# Patient Record
Sex: MALE | Race: WHITE | NOT HISPANIC OR LATINO | URBAN - METROPOLITAN AREA
[De-identification: names, ages, dates, MRNs, and addresses within clinical notes are randomized per-mention and may not be internally consistent; named-entity substitution may affect disease eponyms.]

---

## 2014-05-21 LAB
CREAT SERPL-MCNC: 0.99 MG/DL (ref 0.72–1.25)
GLUCOSE BLD-MCNC: 80 MG/DL (ref 65–100)

## 2017-01-09 ENCOUNTER — OFFICE VISIT - RIVER FALLS (OUTPATIENT)
Dept: FAMILY MEDICINE | Facility: CLINIC | Age: 72
End: 2017-01-09

## 2017-01-18 ENCOUNTER — OFFICE VISIT - RIVER FALLS (OUTPATIENT)
Dept: FAMILY MEDICINE | Facility: CLINIC | Age: 72
End: 2017-01-18

## 2017-01-18 ASSESSMENT — MIFFLIN-ST. JEOR: SCORE: 1631.67

## 2017-04-03 ENCOUNTER — OFFICE VISIT - RIVER FALLS (OUTPATIENT)
Dept: FAMILY MEDICINE | Facility: CLINIC | Age: 72
End: 2017-04-03

## 2017-04-12 ENCOUNTER — OFFICE VISIT - RIVER FALLS (OUTPATIENT)
Dept: FAMILY MEDICINE | Facility: CLINIC | Age: 72
End: 2017-04-12

## 2017-04-12 ASSESSMENT — MIFFLIN-ST. JEOR: SCORE: 1604.46

## 2017-05-12 ENCOUNTER — OFFICE VISIT - RIVER FALLS (OUTPATIENT)
Dept: FAMILY MEDICINE | Facility: CLINIC | Age: 72
End: 2017-05-12

## 2017-05-12 ASSESSMENT — MIFFLIN-ST. JEOR: SCORE: 1604.46

## 2017-08-23 ENCOUNTER — AMBULATORY - RIVER FALLS (OUTPATIENT)
Dept: FAMILY MEDICINE | Facility: CLINIC | Age: 72
End: 2017-08-23

## 2017-08-24 LAB
CHOLEST SERPL-MCNC: 183 MG/DL
CHOLEST/HDLC SERPL: 3.8 {RATIO}
CREAT SERPL-MCNC: 1.06 MG/DL (ref 0.7–1.18)
GLUCOSE BLD-MCNC: 91 MG/DL (ref 65–99)
HBA1C MFR BLD: 5.5 %
HDLC SERPL-MCNC: 48 MG/DL
LDLC SERPL CALC-MCNC: 112 MG/DL
NONHDLC SERPL-MCNC: 135 MG/DL
TRIGL SERPL-MCNC: 124 MG/DL

## 2017-08-30 ENCOUNTER — OFFICE VISIT - RIVER FALLS (OUTPATIENT)
Dept: FAMILY MEDICINE | Facility: CLINIC | Age: 72
End: 2017-08-30

## 2017-08-30 ASSESSMENT — MIFFLIN-ST. JEOR: SCORE: 1646.19

## 2018-01-01 ENCOUNTER — OFFICE VISIT - RIVER FALLS (OUTPATIENT)
Dept: FAMILY MEDICINE | Facility: CLINIC | Age: 73
End: 2018-01-01

## 2018-01-01 ENCOUNTER — AMBULATORY - RIVER FALLS (OUTPATIENT)
Dept: FAMILY MEDICINE | Facility: CLINIC | Age: 73
End: 2018-01-01

## 2018-01-01 LAB
CREAT SERPL-MCNC: 0.76 MG/DL
CREAT SERPL-MCNC: 0.77 MG/DL
CREAT SERPL-MCNC: 1.11 MG/DL (ref 0.7–1.18)
CREAT SERPL-MCNC: 1.28 MG/DL (ref 0.72–1.25)
GLUCOSE BLD-MCNC: 116 MG/DL (ref 65–99)
HBA1C MFR BLD: 5.8 %

## 2018-01-01 ASSESSMENT — MIFFLIN-ST. JEOR
SCORE: 1560
SCORE: 1565.45
SCORE: 1476.54
SCORE: 1612.62
SCORE: 1610.81

## 2018-01-24 ENCOUNTER — OFFICE VISIT - RIVER FALLS (OUTPATIENT)
Dept: FAMILY MEDICINE | Facility: CLINIC | Age: 73
End: 2018-01-24

## 2018-01-24 ENCOUNTER — COMMUNICATION - RIVER FALLS (OUTPATIENT)
Dept: FAMILY MEDICINE | Facility: CLINIC | Age: 73
End: 2018-01-24

## 2018-01-24 LAB
CREAT SERPL-MCNC: 1.14 MG/DL (ref 0.72–1.25)
GLUCOSE BLD-MCNC: 100 MG/DL (ref 65–100)

## 2018-01-24 ASSESSMENT — MIFFLIN-ST. JEOR: SCORE: 1666.15

## 2018-01-26 ENCOUNTER — OFFICE VISIT - RIVER FALLS (OUTPATIENT)
Dept: FAMILY MEDICINE | Facility: CLINIC | Age: 73
End: 2018-01-26

## 2018-01-26 ASSESSMENT — MIFFLIN-ST. JEOR: SCORE: 1664.33

## 2022-02-11 VITALS
BODY MASS INDEX: 29.95 KG/M2 | HEIGHT: 68 IN | HEIGHT: 68 IN | TEMPERATURE: 97.9 F | HEART RATE: 90 BPM | SYSTOLIC BLOOD PRESSURE: 142 MMHG | SYSTOLIC BLOOD PRESSURE: 118 MMHG | WEIGHT: 209.8 LBS | WEIGHT: 198 LBS | DIASTOLIC BLOOD PRESSURE: 68 MMHG | TEMPERATURE: 97.8 F | BODY MASS INDEX: 30.01 KG/M2 | HEART RATE: 113 BPM | HEART RATE: 76 BPM | TEMPERATURE: 99.4 F | DIASTOLIC BLOOD PRESSURE: 62 MMHG | SYSTOLIC BLOOD PRESSURE: 120 MMHG | DIASTOLIC BLOOD PRESSURE: 64 MMHG | OXYGEN SATURATION: 97 % | WEIGHT: 209.4 LBS | TEMPERATURE: 102.1 F | SYSTOLIC BLOOD PRESSURE: 124 MMHG | DIASTOLIC BLOOD PRESSURE: 56 MMHG | HEIGHT: 68 IN | BODY MASS INDEX: 31.74 KG/M2 | DIASTOLIC BLOOD PRESSURE: 50 MMHG | HEART RATE: 78 BPM | WEIGHT: 197.6 LBS | BODY MASS INDEX: 31.8 KG/M2 | HEIGHT: 68 IN | HEART RATE: 84 BPM | SYSTOLIC BLOOD PRESSURE: 144 MMHG

## 2022-02-11 VITALS
TEMPERATURE: 97.9 F | DIASTOLIC BLOOD PRESSURE: 70 MMHG | TEMPERATURE: 98.5 F | WEIGHT: 187.6 LBS | DIASTOLIC BLOOD PRESSURE: 60 MMHG | OXYGEN SATURATION: 99 % | SYSTOLIC BLOOD PRESSURE: 128 MMHG | HEART RATE: 86 BPM | OXYGEN SATURATION: 98 % | BODY MASS INDEX: 28.86 KG/M2 | HEIGHT: 68 IN | BODY MASS INDEX: 28.43 KG/M2 | HEART RATE: 89 BPM | WEIGHT: 189.8 LBS | SYSTOLIC BLOOD PRESSURE: 120 MMHG

## 2022-02-11 VITALS
BODY MASS INDEX: 30.65 KG/M2 | SYSTOLIC BLOOD PRESSURE: 114 MMHG | OXYGEN SATURATION: 93 % | DIASTOLIC BLOOD PRESSURE: 72 MMHG | HEART RATE: 83 BPM | HEIGHT: 68 IN | TEMPERATURE: 97.8 F | WEIGHT: 202.2 LBS

## 2022-02-11 VITALS
SYSTOLIC BLOOD PRESSURE: 110 MMHG | SYSTOLIC BLOOD PRESSURE: 120 MMHG | BODY MASS INDEX: 26.3 KG/M2 | HEIGHT: 68 IN | HEART RATE: 96 BPM | BODY MASS INDEX: 26.88 KG/M2 | WEIGHT: 173 LBS | WEIGHT: 176.8 LBS | DIASTOLIC BLOOD PRESSURE: 64 MMHG | TEMPERATURE: 97.8 F | SYSTOLIC BLOOD PRESSURE: 118 MMHG | TEMPERATURE: 97.4 F | DIASTOLIC BLOOD PRESSURE: 60 MMHG | HEART RATE: 90 BPM | HEART RATE: 80 BPM | OXYGEN SATURATION: 98 % | TEMPERATURE: 97.9 F | HEIGHT: 68 IN | WEIGHT: 168 LBS | WEIGHT: 186.4 LBS | DIASTOLIC BLOOD PRESSURE: 60 MMHG | SYSTOLIC BLOOD PRESSURE: 108 MMHG | HEART RATE: 84 BPM | DIASTOLIC BLOOD PRESSURE: 68 MMHG | TEMPERATURE: 97 F | BODY MASS INDEX: 28.25 KG/M2 | BODY MASS INDEX: 25.46 KG/M2

## 2022-02-11 VITALS
WEIGHT: 196.2 LBS | SYSTOLIC BLOOD PRESSURE: 121 MMHG | WEIGHT: 196.2 LBS | HEART RATE: 84 BPM | DIASTOLIC BLOOD PRESSURE: 78 MMHG | BODY MASS INDEX: 29.73 KG/M2 | HEIGHT: 68 IN | BODY MASS INDEX: 29.73 KG/M2 | HEIGHT: 68 IN | TEMPERATURE: 98.1 F | TEMPERATURE: 97.7 F

## 2022-02-11 VITALS
HEART RATE: 76 BPM | HEIGHT: 68 IN | SYSTOLIC BLOOD PRESSURE: 136 MMHG | DIASTOLIC BLOOD PRESSURE: 81 MMHG | BODY MASS INDEX: 31.13 KG/M2 | TEMPERATURE: 97.9 F | WEIGHT: 205.4 LBS

## 2022-02-16 NOTE — PROGRESS NOTES
Chief Complaint    lump on left thigh, painfull while walking.  History of Present Illness      Patient is here with complaints of a painful mass his posterior left thigh.  It bothers him when he is walking is sitting in a hard chair.  He noticed this after he lost about 30 pounds.  He has a history of myelodysplastic syndrome and has transfusion dependent anemia and thrombocytopenia.  He was recently hospitalized for a neutropenic fever.  He denies any trauma.  There has been no changes in his skin.  Review of Systems      Insert default review systems  Physical Exam   Vitals & Measurements    T: 97.0   F (Tympanic)  HR: 84(Peripheral)  BP: 120/68       Alert, oriented, no acute distress      Normal heart rate      Nonlabored breathing, lungs clear       6 cm, soft, slightly tender mass posterior left thigh  Assessment/Plan       Lipoma of left thigh(D17.24)        Location and texture of this lesion are consistent with a lipoma.        Ultrasound for further evaluation to differentiate this from other issues such as hematoma or sarcoma.  Patient Information     Name:SANTOSH TEJADA      Address:      [address] [place] [address]      Sex:Male      YOB: 1945      [phone]      Emergency Contact:BERNIE CARRASCO     MRN:[id number]     FIN:[id number]     Location:[facility]     Date of Service:07/02/2018      Primary Care Physician:       Dylon Evans MD, [phone]      Attending Physician:       Dylon Ramon MD, [phone]  Problem List/Past Medical History    Ongoing     Chemical dependency       Comments: leading to liver failure     Chemotherapy-induced neutropenia     Chronic subdural hematoma     Esophageal varices     Hayfever     History of osteopenia     Hypertensive disorder       Comments: Due to Cirrhosis     Liver cirrhosis, alcoholic     Lymphoplasmacytoid lymphoma, CLL     Malrotation of intestine     Myelodysplastic syndrome     Neuropathy      Neutropenic fever     Obesity     Osteoporosis     Pancytopenia     Prediabetes     Renal insufficiency     Tubular adenoma    Historical     Esophageal ulcer       Comments: With esophagitis; blood transfusion 2007     Inguinal hernia     Inpatient stay       Comments: @[facility] - neutropenic fever     Inpatient stay       Comments: @[facility], [place] - Myelodysplastic syndrome with persistent febrile neutropenia.     Inpatient stay       Comments: @[place], [place] - Neutropenic fever  Procedure/Surgical History     Bone marrow biopsy (06/11/2018)     Bone marrow biopsy (06/01/2018)     Bone marrow biopsy (11/04/2016)     Colonoscopy (04/08/2015)     Esophagogastroduodenoscopy (04/05/2011)     Colonoscopy (02/23/2011)     Colonoscopy (02/23/2010)     Echocardiogram (10/2009)     Colonoscopy (09/2005)     Cataract extraction (12/2000)     Colonoscopy (1984)     Herniorrhaphy (1981)     Herniorrhaphy (1959)     Appendectomy (1957)     Repair procedure for malrotation of intestine  Medications        Vitamin B1: 100 mg, daily.        Centrum oral tablet: 1 tab(s), PO, Daily, 90 tab(s).        Claritin 10 mg oral tablet: 10 mg, 1 tab(s), PO, Daily, for 10 day(s), 10 tab(s), 0 Refill(s).        calcium (as carbonate) 600 mg oral tablet: 600 mg, 1 tab(s), po, tid, 0 Refill(s).        MiraLax oral powder for reconstitution: 17 gm, po, daily, 0 Refill(s).        Zantac 300 oral tablet: 300 mg, 1 tab(s), PO, Once a day (at bedtime), Will try instead of omeprazole, 95 tab(s), 3 Refill(s).        nortriptyline 50 mg oral capsule: 50 mg, 1 cap(s), PO, daily, 95 cap(s), 3 Refill(s).        Neurontin 600 mg oral tablet: 600 mg, 1 tab(s), PO, qid, 370 tab(s), 3 Refill(s).        Vitamin C 1000 mg oral tablet: 1,000 mg, 1 tab(s), po, daily, 0 Refill(s).        prochlorperazine 10 mg oral tablet: 10 mg, 1 tab(s), po, q6 hrs, Per [facility] 3/11/2018, 0 Refill(s).        Vitamin K-2: take  100 mcg once daily, 0 Refill(s).        Magic Mouthwash: take 10 mL by mouth 4 times daily if needed. Swish and spit for mouth sores, garle for sore throat, 0 Refill(s).        predniSONE 20 mg oral tablet: See Instructions, 2 tab(s) po daily 5 day(s), then 1 tab daily x 5 days, then 0.5 tab daily x 6 days, 0 Refill(s).        voriconazole 200 mg oral tablet: 200 mg, 1 tab(s), po, q12 hrs, for 21 day(s), start date 6/13/18 end date 7/4/18 per [employer], 0 Refill(s).                Allergies    spironolactone (gynecomastia)  Social History    Smoking Status - 07/02/2018     Never smoker     Alcohol - Past, 03/08/2012      Past, 03/08/2012     Employment and Education      Retired, 03/09/2011     Exercise and Physical Activity - Does not exercise, 03/08/2012     Tobacco - Denies Tobacco Use, 03/15/2010      Never, 03/08/2012  Family History    CA - Cancer of colon: Father.    Cerebral aneurysm: Mother.    Diabetes mellitus: Brother.    Hypertension: Sister.    Meningitis: Brother.    Rheumatic fever: Sister.    Seizure disorder: Brother.    Systemic Lupus Erythematosus: Sister.  Immunizations      Vaccine Date Status Comments      influenza virus vaccine, inactivated 08/30/2017 Given      influenza virus vaccine, inactivated 11/03/2016 Given      hepatitis B adult vaccine 03/30/2016 Given      influenza virus vaccine, inactivated 10/13/2015 Given      hepatitis A adult vaccine 10/13/2015 Given      pneumococcal (PCV13) 10/13/2015 Given      hepatitis B adult vaccine 10/13/2015 Given      hepatitis B adult vaccine 04/13/2015 Given      hepatitis B adult vaccine 03/11/2015 Given      hepatitis A adult vaccine 03/11/2015 Given      pneumococcal (PPSV23) 09/11/2014 Given      influenza virus vaccine, inactivated 09/11/2014 Given      tetanus/diphth/pertuss (Tdap) adult/adol 12/22/2011 Given Patient gave verbal consent.      influenza 09/29/2009 Recorded      pneumococcal (PPSV23) 09/01/2009 Recorded      Td 11/12/2002  Recorded      Td 11/01/1992 Recorded  Lab Results          Lab Results (Last 4 results within 90 days)           WBC: 2.0 Low [4.5  - 11] (06/14/18 14:44:00)          RBC: 3.06 Low [4.3  - 5.9] (06/14/18 14:44:00)          Hgb: 9.1 Low [13.5 g/dL - 17.5 g/dL] (06/14/18 14:44:00)          Hct: 26.8 Low [37 % - 53 %] (06/14/18 14:44:00)          MCV: 88 [80 fL - 100 fL] (06/14/18 14:44:00)          MCH: 29.7 [26 pg - 34 pg] (06/14/18 14:44:00)          MCHC: 34.0 [32 g/dL - 36 g/dL] (06/14/18 14:44:00)          RDW: 12.8 [11.5 % - 15.5 %] (06/14/18 14:44:00)          Platelet: 29 Low [140  - 440] (06/14/18 14:44:00)          MPV: 10.9 [6.5 fL - 11 fL] (06/14/18 14:44:00)          Metamyelocytes Man: 0.0 [80 fL - 100 fL] (06/14/18 14:44:00)          Myelocytes Man: 0.0 [6.5 fL - 11 fL] (06/14/18 14:44:00)          Promyelocytes Man: 0.0 [140  - 440] (06/14/18 14:44:00)          Blasts Man: 0.0 (06/14/18 14:44:00)          Other Cells Man: 0.0 [0.9  - 2.9] (06/14/18 14:44:00)          Lymphocytes: 29.0 [4.5  - 11] (06/14/18 14:44:00)          Abs Lymphocytes: 0.6 Low [0.9  - 2.9] (06/14/18 14:44:00)          Neutrophils: 43.0 [0.9  - 2.9] (06/14/18 14:44:00)          Abs Neutrophils: 0.9 Low [1.7  - 7] (06/14/18 14:44:00)          Monocytes: 28.0 [4.5  - 11] (06/14/18 14:44:00)          Abs Monocytes: 0.6 [0.9  - 2.9] (06/14/18 14:44:00)          Eosinophils: 0.0 [4.5  - 11] (06/14/18 14:44:00)          Abs Eosinophils: 0.0 [0.9  - 2.9] (06/14/18 14:44:00)          Basophils: 0.0 [4.5  - 11] (06/14/18 14:44:00)          Abs Basophils: 0.0 [0.9  - 2.9] (06/14/18 14:44:00)          Plasma Cells: 0.0 [0.9  - 2.9] (06/14/18 14:44:00)          NRBCs/100 WBC: 3.0 [4.5  - 11] (06/14/18 14:44:00)          NRBCs Abs#: 0.1 [4.5  - 11] (06/14/18 14:44:00)          Platelet Estimate: Decreased [0.9  - 2.9] (06/14/18 14:44:00)          Basophillic Stippling: Present [4.5  - 11] (06/14/18 14:44:00)          RBC Comments: Present [0.9   - 2.9] (06/14/18 14:44:00)

## 2022-02-16 NOTE — PROGRESS NOTES
Chief Complaint    Decrease of sensation in right hand, has been dropping things. Started yesterday afternoon.  History of Present Illness      Has lost tactile feeling in the right hand starting yesterday. Began Vidaza daily for 7 days on Monday and symptoms began the next day. No vision or hearing changes. No headaches. Denies weakness in the hand but dropping things due to lack of sensation.  Review of Systems      No fevers       No vomiting       No shortness of breath       No significantly increased bruising  Physical Exam   Vitals & Measurements    T: 99.4(Tympanic)  HR: 90(Peripheral)  BP: 124/62     HT: 68 in  WT: 197.6 lb  BMI: 30.04       General: No acute distress.      HENT: Tympanic membranes are clear, No pharyngeal erythema.      Neck: No lymphadenopathy.      Respiratory: Lungs are clear to auscultation.      Cardiovascular: Normal rate, Regular rhythm.      Musculoskeletal: Normal gait.  Good strength and range of motion in both arms and legs.  Good  strength in right hand.       Neurologic: Cranial nerves II through XII are grossly intact.  No focal deficits  Assessment/Plan   Paresthesia: Possible side effect from Vidaza.  Discussed possibility of cerebral hemorrhage with his low platelets but would expect more symptoms.  Patient declined imaging and lab testing at this time unless symptoms progress.  He wants to monitor and follow-up if things increase.  Patient Information     Name:SANTOSH TEJADA      Address:      [address] [place] [id number]     Sex:Male     YOB: 1945     [phone]     Emergency Contact:MARIA T TEJADA     MRN:[id number]     FIN:[id number]     Location:[facility]     Date of Service:02/14/2018      Primary Care Physician:       Dylon Evans MD, [phone]  Medications        CLA- Safflower oil: 1,000 mg, 0 Refill(s).        Vitamin C 1000 mg oral tablet: 1,000 mg, 1 tab(s), po, daily, 0 Refill(s).        Vidaza:  75 mg/m2, subcutaneous, q 24 hrs, 0 Refill(s).        calcium (as carbonate) 600 mg oral tablet: 600 mg, 1 tab(s), po, tid, 0 Refill(s).        ciprofloxacin 500 mg oral tablet: 500 mg, 1 tab(s), po, q12 hrs, 0 Refill(s).        ferrous sulfate 325 mg (65 mg elemental iron) oral tablet: 325 mg, 1 tab(s), po, daily, 0 Refill(s).        fluconazole 100 mg oral tablet: 100 mg, 1 tab(s), po, daily, 0 Refill(s).        Neurontin 600 mg oral tablet: 600 mg, 1 tab(s), PO, qid, 370 tab(s), 3 Refill(s).        Claritin 10 mg oral tablet: 10 mg, 1 tab(s), PO, Daily, for 10 day(s), 10 tab(s), 0 Refill(s).        Centrum oral tablet: 1 tab(s), PO, Daily, 90 tab(s).        nortriptyline 50 mg oral capsule: 50 mg, 1 cap(s), PO, daily, 95 cap(s), 3 Refill(s).        MiraLax oral powder for reconstitution: 17 gm, po, daily, 0 Refill(s).        Zantac 300 oral tablet: 300 mg, 1 tab(s), PO, Once a day (at bedtime), Will try instead of omeprazole, 95 tab(s), 3 Refill(s).        Vitamin B1: 100 mg, daily.        valACYclovir 500 mg oral tablet: 500 mg, 1 tab(s), po, daily, 0 Refill(s).                Allergies    spironolactone (gynecomastia)

## 2022-02-16 NOTE — PROGRESS NOTES
Patient:   SANTOSH TEJADA            MRN: [id number]            FIN: [id number]               Age:   71 years     Sex:  Male     :  1945   Associated Diagnoses:   Liver Cirrhosis, Alcoholic; Osteopenia; Esophageal varices; Pruritus; Polyneuropathy; Lymphoplasmacytoid lymphoma, CLL   Author:   Dylon Evans MD      Visit Information      Date of Service: 2017 05:47 pm  Performing Location: [facility]  Encounter#: [id number]      Primary Care Provider (PCP):  Dylon Evans MD    NPI# [id number]      Referring Provider:  No referring provider recorded for selected visit.      Chief Complaint   2017 5:59 PM CST    pt here for fu labs        History of Present Illness     Had two teeth removed a week ago. Has been on PCN and clindamycin. Has plans to have at least 2 more teeth to be removed due to infection.    Itching has been less. Minimal swelling in the legs since decreasing the torsemide. Energy and appetitie are good.   Has some burning on the tips of the toes and feet. Neurontin and nortriptylline with benefit. Stopped lyrica due to expense.  Weight is down about 10 lbs.  Chronic hoarseness for years.    Had been taking ibuprofen couple times a week but stopped it when received lab results.      Review of Systems   Constitutional:  naltrexone has helped the itching.    Respiratory:  No shortness of breath.    Cardiovascular:  No chest pain, No peripheral edema.    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    Integumentary:  easy bruising and skin tears easily.    Neurologic:  No dizziness.       Health Status   Allergies:    Allergic Reactions (Selected)  Severity Not Documented  Spironolactone (Gynecomastia)   Medications:  (Selected)   Prescriptions  Prescribed  Demadex 10 mg oral tablet: 2 tab(s) ( 20 mg ), PO, daily, # 190 tab(s), 3 Refill(s), Type: Soft Stop, Pharmacy: [facility], 2 tab(s) po daily  K-Dur 20 oral tablet,  extended release: 1 tab(s) ( 20 mEq ), po, daily, # 95 tab(s), 3 Refill(s), Type: Soft Stop, Pharmacy: [facility], member number [phone], 1 tab(s) po daily  Neurontin 600 mg oral tablet: 1 tab(s) ( 600 mg ), PO, qid, # 370 tab(s), 3 Refill(s), Type: Maintenance, [facility], 1 tab(s) po qid  Zantac 300 oral tablet: 1 tab(s) ( 300 mg ), PO, Once a day (at bedtime), Instructions: Will try instead of omeprazole, # 95 tab(s), 3 Refill(s), Type: Maintenance, [facility], 1 tab(s) po hs,Instr:Will try instead of omeprazole  naltrexone 50 mg oral tablet: 1 tab(s) ( 50 mg ), po, daily, # 95 tab(s), 3 Refill(s), Type: Maintenance, [facility], 1 tab(s) po daily  nortriptyline 50 mg oral capsule: 1 cap(s) ( 50 mg ), PO, daily, # 95 cap(s), 3 Refill(s), Type: Maintenance, [facility], 1 cap(s) po daily  Documented Medications  Documented  CLA- Safflower oil: CLA- Safflower oil, ( 1,000 mg ), Supply, 0 Refill(s), Type: Maintenance  Centrum oral tablet: 1 tab(s), PO, Daily, # 90 tab(s), 0 Refill(s)  Citracal: 3 tab(s), po, daily, 0 Refill(s), Type: Maintenance  Claritin 10 mg oral tablet: 1 tab(s) ( 10 mg ), PO, Daily, # 10 tab(s), 0 Refill(s), Type: Maintenance  Iron: Iron, ( 65 mg ), po, daily, Supply, 0 Refill(s), Type: Maintenance  Vitamin B1: ( 100 mg ), daily, 0 Refill(s), Type: Maintenance  torsemide 10 mg oral tablet: 2 tab(s) ( 20 mg ), po, daily, 0 Refill(s), Type: Maintenance   Problem list:    All Problems  Esophageal varices / ICD-9-.1 / Confirmed  Hayfever / ICD-9-.9 / Confirmed  Osteopenia / ICD-9-CM V13.59 / Confirmed  Hypertension / SNOMED CT 0085302455 / Confirmed  Liver Cirrhosis, Alcoholic / ICD-9-.2 / Confirmed  Lymphoplasmacytoid lymphoma, CLL / SNOMED CT 153972805 / Confirmed  Neuropathy / ICD-9-.9 / Confirmed  Obesity / ICD-9-.00 / Probable  Prediabetes / ICD-9-.29 /  Confirmed  Renal Insufficiency / ICD-9-.9 / Confirmed  Tubular Adenoma / ICD-9-.9 / Confirmed  Inactive: Chemical Dependency / ICD-9-.90  Inactive: Malrotation of Intestine / ICD-9-.4  Inactive: Osteoporosis / ICD-9-.00  Resolved: Esophageal Ulcer / ICD-9-.20  Resolved: Inguinal Hernia / ICD-9-.90      Histories   Family History: Sister with SLE; Brother with seizure disorder; Dad with colon cancer; mom  cerebral aneurysm    Procedure history:    Bone marrow biopsy (SNOMED CT 237450934) performed by Candace Harris NP on 2016 at 71 Years.  Comments:  2016 9:31 AM - Zelda Grande  Indication: Lymphoma.  Colonoscopy (SNOMED CT 338883286) performed by Dylon Evans MD on 2015 at 70 Years.  Comments:  2015 4:35 PM - Nikki Villalobos RN  Sedation: Propofol  Indication: history of tubular adenoma  Findings: Diverticulosis of the sigmoid and ascending colon. Benign leiomyoma.  F/U: Repeat colonoscopy in 5 years  Esophagogastroduodenoscopy (SNOMED CT 3194581931) performed by Favian Aggarwal MD on 2011 at 66 Years.  Comments:  2011 7:15 AM - Dylon Evans MD  Small esophageal varices; portal hypertensive gastropathy; small hiatal hernia  Colonoscopy (SNOMED CT 461881401) on 2011 at 65 Years.  Colonoscopy (SNOMED CT 506551241) on 2010 at 64 Years.  Comments:  2/3/2015 5:13 PM - Lakeisha Aceves MA  IV sedation used.    10/7/2010 7:31 PM - Cathy Romero  Repeat Colonoscopy in 5 years    3/15/2010 12:28 PM - Allegra Gomez  normal  Echocardiogram (SNOMED CT 2128782565) in the month of 10/2009 at 64 Years.  Comments:  3/15/2010 12:28 PM - Allegra Gomez  EF 65%  Colonoscopy (SNOMED CT 330109810) in the month of 2005 at 60 Years.  Comments:  3/15/2010 12:28 PM - Allegra Gomez  Tubular adenoma  Cataract extraction (SNOMED CT 04674644) in the month of 2000 at 55 Years.  Colonoscopy (SNOMED CT 999741506) in  at  39 Years.  Herniorrhaphy (SNOMED CT 55565507) in 1981 at 36 Years.  Herniorrhaphy (SNOMED CT 43532086) in 1959 at 14 Years.  Comments:  3/15/2010 12:25 PM - Patricia , Allegra  right  Appendectomy (SNOMED CT 052562854) in 1957 at 12 Years.  Repair procedure for malrotation of intestine.   Social History: Living in house by himself      Physical Examination   VS/Measurements   General:  Alert and oriented, No acute distress.    Neck:  No lymphadenopathy.    Respiratory:  Lungs are clear to auscultation.    Cardiovascular:  Normal rate, Regular rhythm, No edema.    Musculoskeletal:  Normal strength, Normal gait, both hands with dupuytrens contractures.    Integumentary:  minimal bruising.       Review / Management     HgbA1c: 5.7% March 2016   MELD score: 7  Recent: hgb 10.7 with PLT 57 and WBC 1.3      Impression and Plan   Diagnosis     Liver Cirrhosis, Alcoholic (AVC01-LD K70.30).     Osteopenia (XDG17-HK M85.80).     Esophageal varices (NFK80-DO I85.00).     Pruritus (YUA40-II L29.9).     Polyneuropathy (CWD64-OB G62.1).     Lymphoplasmacytoid lymphoma, CLL (ZOQ80-YN C83.00).       Liver Cirrhosis: weighed 188 lbs in April 2011. Taking the torsemide 20mg daily and spironolactone was stopped. Weight prior to cirrhosis was 178. Current weight around 200 since Sept 2012. Gets liver ultrasound and AFP every 6 months ( Jan 2017). Instructed by GI not to follow ammonia level unless symptomatic. Will follow labs every 6 months. Decrease torsemide to 10mg daily and recheck labs in March  Pruritis: continue naltrexone. Working well enough. Not interested in a different medication  Osteopenia: diagnosed March 2010 with spine -4.9. Took fosamax. Recheck showed osteopenia  -1.76 in 2012 and so stopped fosamax. Recheck in 2015 showed -1.2 femur and 0.4 spine with planned recheck in 2020  Hypokalemia: normal recently  Renal insufficiency: has stopped NSAIDs. Creatinine is stable with hospital labs  GERD: ranitidine  controlling the acid reflux (switched from omeprazole)  Lymphoplasmacytoid: Stage IV and remission. Now has myelodysplastic syndrome and pancytopenia    Foot Neuropathy: notriptylline and gabapentin. Stopped lyrica 2015 and doing well. Pain worse at night.  Left heel pain: diagnosed with plantar neuroma and doing well with new shoes  Colon cancer screen: adenoma 2005 and normal 2010. Benign Leiomyoma 2015 and repeat 2020  Prostate Cancer screening: not indicated  EGD: 2009 showed grade 0/1 esophageal varices and gastric portal hypertension. Repeat 2015 showed stable and consider repeat 2020  Immunizations: Pneumovax: 2009/2014. Adacel 2011. Prevnar 2015  Chronic hoarsemess: uses biotene  Code Status: full code but reassess frequently and pull plug if not improving

## 2022-02-16 NOTE — PROGRESS NOTES
Patient:   SANTOSH TEJADA            MRN: [id number]            FIN: [id number]               Age:   72 years     Sex:  Male     :  1945   Associated Diagnoses:   Liver Cirrhosis, Alcoholic; Preoperative examination; Left shoulder pain; Myelodysplastic disease   Author:   Dylon Evans MD      Preoperative Information   Indication for surgery:  Bone marrow biopsy.       Chief Complaint   2017 5:52 PM CDT    Hip bone biopsy at [place] on 17       Finished treatment for Lymphoplasmacytoid Lymphoma.  Bone marrow biopsy to monitor progression of the Leukemia    Blood transfusion during hospitalization for GI bleed  and  for Lymphoplasmocytoid lymphoma  No prior problems with anesthesia  No family history of anesthesia problems  No positive responses for sleep apnea  Denies plavix, coumadin  Denies recent corticosteroid use    Exercise capacity: able to easily go up and down stairs. He shovels his driveway and performs yardwork without a problem.    Possible history of DVT after kicked by cow in leg age 17    Liver cirrhosis diagnosed . MELD score has been normal. Maintained on diuretics and stable. Had paracentesis 5L at time of diagnosis but none since. Serial ultrasounds have shown cirrhotic liver.    No history of heart attack.         Review of Systems   Constitutional:  No fever, No chills, No sweats.    Ear/Nose/Mouth/Throat:  Negative.    Respiratory:  No shortness of breath.    Cardiovascular:  No chest pain.    Gastrointestinal:  No nausea, No vomiting, No diarrhea, No heartburn.    Genitourinary:  Negative.    Musculoskeletal:  Negative.    Integumentary:  No rash.    Neurologic:  Alert and oriented X4, numbness and pain in the feet, No headache.    Psychiatric:  Negative.    All other systems reviewed and negative     Left shoulder hurts when sleeping and certain activities. Pain is intermittent. Minimal weakness      Health Status   Allergies:    Allergic Reactions  (Selected)  Severity Not Documented  Spironolactone (Gynecomastia)   Medications:  (Selected)   Prescriptions  Prescribed  Demadex 10 mg oral tablet: 1 tab(s) ( 10 mg ), PO, daily, # 95 tab(s), 3 Refill(s), Type: Soft Stop, Pharmacy: [facility], 1 tab(s) po daily  K-Dur 20 oral tablet, extended release: 1 tab(s) ( 20 mEq ), po, daily, # 95 tab(s), 3 Refill(s), Type: Soft Stop, Pharmacy: [facility], member number [id number], 1 tab(s) po daily  Neurontin 600 mg oral tablet: 1 tab(s) ( 600 mg ), PO, qid, # 370 tab(s), 3 Refill(s), Type: Maintenance, Pharmacy: [facility], 1 tab(s) po qid  Zantac 300 oral tablet: 1 tab(s) ( 300 mg ), PO, Once a day (at bedtime), Instructions: Will try instead of omeprazole, # 95 tab(s), 3 Refill(s), Type: Maintenance, Pharmacy: [facility], 1 tab(s) po hs,Instr:Will try instead of omeprazole  naltrexone 50 mg oral tablet: 1 tab(s) ( 50 mg ), po, daily, # 95 tab(s), 3 Refill(s), Type: Maintenance, Pharmacy: [facility], 1 tab(s) po daily  nortriptyline 50 mg oral capsule: 1 cap(s) ( 50 mg ), PO, daily, # 95 cap(s), 3 Refill(s), Type: Maintenance, Pharmacy: [facility], 1 cap(s) po daily  Documented Medications  Documented  CLA- Safflower oil: CLA- Safflower oil, ( 1,000 mg ), Supply, 0 Refill(s), Type: Maintenance  Centrum oral tablet: 1 tab(s), PO, Daily, # 90 tab(s), 0 Refill(s)  Claritin 10 mg oral tablet: 1 tab(s) ( 10 mg ), PO, Daily, # 10 tab(s), 0 Refill(s), Type: Maintenance  Iron: Iron, ( 65 mg ), po, daily, Supply, 0 Refill(s), Type: Maintenance  MiraLax oral powder for reconstitution: ( 17 gm ), po, daily, 0 Refill(s), Type: Maintenance  Vitamin B1: ( 100 mg ), daily, 0 Refill(s), Type: Maintenance  calcium (as carbonate) 600 mg oral tablet: 1 tab(s) ( 600 mg ), po, tid, 0 Refill(s), Type: Maintenance  ferrous sulfate 325 mg (65 mg elemental iron) oral tablet: 1 tab(s) ( 325 mg ), po, daily, 0 Refill(s), Type:  Maintenance  torsemide 10 mg oral tablet: 2 tab(s) ( 20 mg ), po, daily, 0 Refill(s), Type: Maintenance   Problem list:    All Problems  Esophageal varices / ICD-9-.1 / Confirmed  Hayfever / ICD-9-.9 / Confirmed  Osteopenia / ICD-9-CM V13.59 / Confirmed  Hypertension / SNOMED CT 1209423405 / Confirmed  Liver Cirrhosis, Alcoholic / ICD-9-.2 / Confirmed  Lymphoplasmacytoid lymphoma, CLL / SNOMED CT 565510608 / Confirmed  Neuropathy / ICD-9-.9 / Confirmed  Obesity / ICD-9-.00 / Probable  Prediabetes / ICD-9-.29 / Confirmed  Renal Insufficiency / ICD-9-.9 / Confirmed  Tubular Adenoma / ICD-9-.9 / Confirmed  Inactive: Chemical Dependency / ICD-9-.90  Inactive: Malrotation of Intestine / ICD-9-.4  Inactive: Osteoporosis / ICD-9-.00  Resolved: Esophageal Ulcer / ICD-9-.20  Resolved: Inguinal Hernia / ICD-9-.90      Histories   Family History: Sister with SLE; Brother with seizure disorder; Dad with colon cancer; mom  cerebral aneurysm   Procedure history:    Bone marrow biopsy (SNOMED CT 438521430) performed by Candace Harris NP on 2016 at 71 Years.  Comments:  2016 9:31 AM - Zelda Grande  Indication: Lymphoma.  Colonoscopy (SNOMED CT 926284118) performed by Dylon Evans MD on 2015 at 70 Years.  Comments:  2015 4:35 PM - Nikki Villalobos RN  Sedation: Propofol  Indication: history of tubular adenoma  Findings: Diverticulosis of the sigmoid and ascending colon. Benign leiomyoma.  F/U: Repeat colonoscopy in 5 years  Esophagogastroduodenoscopy (SNOMED CT 9975802366) performed by Favian Aggarwal MD on 2011 at 66 Years.  Comments:  2011 7:15 AM - Dylon Evans MD  Small esophageal varices; portal hypertensive gastropathy; small hiatal hernia  Colonoscopy (SNOMED CT 662412284) on 2011 at 65 Years.  Colonoscopy (SNOMED CT 325075017) on 2010 at 64 Years.  Comments:  2/3/2015 5:13 PM - Ketan BLACKBURN, [place]  IV  sedation used.    10/7/2010 7:31 PM - Cathy Romero  Repeat Colonoscopy in 5 years    3/15/2010 12:28 PM - Allegra Gomez  normal  Echocardiogram (SNOMED CT 5284234835) in the month of 10/2009 at 64 Years.  Comments:  3/15/2010 12:28 PM - Allegra Gomez  EF 65%  Colonoscopy (SNOMED CT 243419819) in the month of 9/2005 at 60 Years.  Comments:  3/15/2010 12:28 PM - Allegra Gomez  Tubular adenoma  Cataract extraction (SNOMED CT 35276038) in the month of 12/2000 at 55 Years.  Colonoscopy (SNOMED CT 172794469) in 1984 at 39 Years.  Herniorrhaphy (SNOMED CT 11583027) in 1981 at 36 Years.  Herniorrhaphy (SNOMED CT 28117501) in 1959 at 14 Years.  Comments:  3/15/2010 12:25 PM - Allegra Gomez  right  Appendectomy (SNOMED CT 823110045) in 1957 at 12 Years.  Repair procedure for malrotation of intestine.   Social History: Living at home      Physical Examination   Vital Signs   8/30/2017 5:52 PM CDT Temperature Tympanic 97.9 DegF    Peripheral Pulse Rate 76 bpm    BP Systolic Repeat 136 mmHg    BP Diastolic Repeat 81 mmHg      General:  Alert and oriented, No acute distress.    Eye:  Normal conjunctiva.    HENT:  Normocephalic.    Neck:  Non-tender, No lymphadenopathy.    Respiratory:  Lungs are clear to auscultation, Breath sounds are equal.    Cardiovascular:  Normal rate, Regular rhythm, No murmur.    Gastrointestinal:  Soft, Non-tender, Normal bowel sounds, No organomegaly, surgical scars abdomen.    Musculoskeletal:  Normal range of motion, Normal gait.    Integumentary:  Warm, Dry, Pink.    Neurologic:  Alert, Oriented, Normal sensory, No focal deficits.    Psychiatric:  Cooperative, Appropriate mood & affect.    Musculoskeletal: Left normal ROM and strength shoulder. Rotator cuff muscles with good strength but some pain with external rotation. Impingement testing (Bonilla and Neers test normal). Speeds and Yergason maneuver negative       Review / Management   Results review:  Lab  results   8/23/2017 12:24 PM CDT Sodium Level 140 mmol/L    Potassium Level 4.2 mmol/L    Glucose Level 91 mg/dL    Creatinine Level 1.06 mg/dL    Bilirubin Total 0.6 mg/dL    AST/SGOT 27 unit/L    ALT/SGPT 32 unit/L    Hgb A1c 5.5    AFP 1.9 ng/mL   .    Labs July 2017: Hgb 11.1, WBC 1.5 and PLT 45      Echocardiogram 2/2010: mild left atrial enlargement. Normal EF and no LVH    EKG: Sinus rhythm. Normal QT with no qwaves or ST changes. Good R wave progression. Prolonged GA for 1st degree heart block (November 2016)      Impression and Plan   Diagnosis     Liver Cirrhosis, Alcoholic (BAS38-MH K70.30).     Preoperative examination (TIC56-TH Z01.818).     Left shoulder pain (BXB35-OA M25.512).     Myelodysplastic disease (UVR34-MM C94.6).       no contraindications to anesthesia. Liver cirrhosis is stable    Liver Cirrhosis: weighed 188 lbs in April 2011. Taking the torsemide 20mg daily and spironolactone was stopped. Weight prior to cirrhosis was 178. Current weight around 200 since Sept 2012. Gets liver ultrasound and AFP every 6 months ( Jan 2016). Instructed by GI not to follow ammonia level unless symptomatic. Will follow labs every 6 months. Recent MELD score 8  Pruritis: continue naltrexone. Working well enough. Not interested in a different medication  Osteopenia: diagnosed March 2010 with spine -4.9. Took fosamax. Recheck showed osteopenia  -1.76 in 2012 and so stopped fosamax. Recheck in 2015 showed -1.2 femur and 0.4 spine with planned recheck in 2020  Hypokalemia: normal recently  Renal insufficiency: has stopped NSAIDs. 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  Myelodysplastic syndrome: developed after the lymphoplasmacytoid and stable.    Foot Neuropathy: notriptylline and gabapentin. Stopped lyrica 2015 and doing  well. Pain worse at night.  Left heel pain: diagnosed with plantar neuroma and doing well with new shoes  Left shoulder pain: recommend PT    EGD: 2009 showed grade 0/1 esophageal varices and gastric portal hypertension. Repeat 2015 showed stable and consider repeat 2020  Immunizations: Pneumovax: 2009/2014. Adacel 2011. Prevnar 2015  Chronic hoarseness: with allergic rhinitis and using biotene. ENT evaluation with no mass on the vocal cords  Code Status: full code but reassess frequently and pull plug if not improving

## 2022-02-16 NOTE — PROGRESS NOTES
Chief Complaint    Fatigue, weakness, heavy breathing.  History of Present Illness      Feeling weak for the last 2 days. Has been shoveling and using the snowblower. Has a mild cough. Denies shortness of breath. Denies urinary symptoms. Did not realize had a fever before coming into the clinic.       In April 2016 had pancytopenia with lymphoplasmacytoid lymphoma.  Treated with chemotherapy and found to have complete pathologic response on bone marrow biopsy in September 2016.  Given persistent pancytopenia had another bone marrow biopsy in November 2016 and found to have myelodysplastic syndrome.        Labs (October 2017)        WBC: 1.8        Hgb: 12.2        PLT: 50K  Review of Systems      No vomiting       No rashes       No chest pain       No headaches       No blood in stools  Physical Exam   Vitals & Measurements    T: 102.1(Tympanic)  HR: 113(Peripheral)  BP: 142/64  SpO2: 97%     HT: 68 in  WT: 209.8 lb  BMI: 31.9        General: No acute distress       Musculoskeletal: Normal gait using a cane.  Good strength and range of motion lower extremities.       HEENT: Normal oropharynx       Neck: Without lymphadenopathy       Lungs: Clear       Heart: Regular rate and rhythm       Abdomen: Soft and nontender       CXR: negative (reviewed images with patient       UA: negative       Hemoglobin: 5.5       Platelets: 26       Neutrophils: 44%       WBC: 1.1       Sodium: 141       Potassium: 4.5       Creatinine: 1.14       Discussed with Dr. Domingo.  No signs of sepsis with the normal qsofa score.  Normal respiratory rate, normal blood pressure and no altered mentation.  Assessment/Plan   Anemia: Discussed with hematology and will transfuse 2 units of leuko-reduced.  Follow-up in 2 days to recheck hemoglobin.  Hematology feels that decrease hemoglobin is likely infectious suppression.    Fever: Treat empirically with Tamiflu and Levaquin.    Patient did not feel he needed hospitalization but will return if  worse.    Greater than 40 minutes spent in care of patient.  Patient Information     Name:SANTOSH TEJADA      Address:      [address] [place] [id number]     Sex:Male     YOB: 1945     [phone]     MRN:[id number]     FIN:[id number]     Location:[facility]     Date of Service:01/24/2018      Primary Care Physician:       Dylon Evans MD, [phone]  Medications        Iron: 65 mg, po, daily, 0 Refill(s).        CLA- Safflower oil: 1,000 mg, 0 Refill(s).        calcium (as carbonate) 600 mg oral tablet: 600 mg, 1 tab(s), po, tid, 0 Refill(s).        ferrous sulfate 325 mg (65 mg elemental iron) oral tablet: 325 mg, 1 tab(s), po, daily, 0 Refill(s).        Neurontin 600 mg oral tablet: 600 mg, 1 tab(s), PO, qid, 370 tab(s), 3 Refill(s).        Claritin 10 mg oral tablet: 10 mg, 1 tab(s), PO, Daily, for 10 day(s), 10 tab(s), 0 Refill(s).        Centrum oral tablet: 1 tab(s), PO, Daily, 90 tab(s).        naltrexone 50 mg oral tablet: 50 mg, 1 tab(s), po, daily, 95 tab(s), 3 Refill(s).        nortriptyline 50 mg oral capsule: 50 mg, 1 cap(s), PO, daily, 95 cap(s), 3 Refill(s).        MiraLax oral powder for reconstitution: 17 gm, po, daily, 0 Refill(s).        K-Dur 20 oral tablet, extended release: 20 mEq, 1 tab(s), po, daily, 95 tab(s), 3 Refill(s).        Zantac 300 oral tablet: 300 mg, 1 tab(s), PO, Once a day (at bedtime), Will try instead of omeprazole, 95 tab(s), 3 Refill(s).        Vitamin B1: 100 mg, daily.        torsemide 10 mg oral tablet: 20 mg, 2 tab(s), po, daily, 0 Refill(s).        Demadex 10 mg oral tablet: 10 mg, 1 tab(s), PO, daily, 95 tab(s), 3 Refill(s).                Allergies    spironolactone (gynecomastia)  Influenza negative but still treat

## 2022-02-16 NOTE — PROGRESS NOTES
Chief Complaint    F/u hosp- fall /weakness  History of Present Illness      Hospitalized for 9 days following a fall. Continues to feel tired. Energy has not improved with hospital stay. Admitted with a Hgb of 5.4 and platelets of 2. Received blood and platelet transfusions. Getting platelet transfusions twice last weekly. Trying to get platelets over 20 and hgb over 7.5. Currently takes valacyclovir, ciprofloxacin and fluconazole prophylactically.  Started Augmentin for cellulitis. Feels cellulitis improving.       Bone mineral biopsy February 2018 showed myelodysplastic syndrome refractory with multilineage dysplasia demonstrating normal marrow 20-30% with 5-7% blasts and mild fibrosis.         Has been taking vitamin K since 2016  Review of Systems      No fevers since going home       No vomiting       No shortness of breath       Denies lightheadedness       Comfortable living by himself  Physical Exam   Vitals & Measurements    T: 97.9(Tympanic)  HR: 89(Peripheral)  BP: 120/60  SpO2: 99%     HT: 68 in  WT: 187.6 lb  BMI: 28.52       General: No acute distress       Neck: Without lymphadenopathy       Lungs: Clear       Heart: Regular rate and rhythm       Abdomen: Soft, nontender and nondistended       Skin: Bruising on the hands dorsal aspect.  Dull erythema resolving       Extremities: Without edema       Labs on hospital discharge       Creatinine: 0.76       Hemoglobin: 7.9       Platelets: 16       WBC 0.6  Assessment/Plan   Pancytopenia: Related to progressive myelodysplastic syndrome.  Followed by oncology and will delay Azactidine for 2 weeks.  Continue with monitoring CBC and getting blood transfusion with platelet transfusions as needed to stay above parameters.    Weight: He is down 20 pounds in the past 2 months.  We will continue to monitor    Discussed DNR. Discussed prognosis    Hospital discharge summary reviewed and medications reconciled  Patient Information     Name:TERRELLSANTOSH       Address:      [address] [place] [id number]     Sex:Male     YOB: 1945     [phone]     Emergency Contact:MARIA T TEJADA     MRN:[id number]     FIN:[id number]     [facility]     Date of Service:03/15/2018      Primary Care Physician:       Dylon Evans MD, [phone]  Medications        CLA- Safflower oil: 1,000 mg, 0 Refill(s).        amoxicillin-clavulanate 875 mg-125 mg oral tablet: 1 tab(s), PO, q12hr, for 10 day(s), Per Hosp. [facility] 3/11/2018, 20 tab(s), 0 Refill(s).        Vitamin C 1000 mg oral tablet: 1,000 mg, 1 tab(s), po, daily, 0 Refill(s).        Vidaza: 75 mg/m2, subcutaneous, q 24 hrs, 0 Refill(s).        calcium (as carbonate) 600 mg oral tablet: 600 mg, 1 tab(s), po, tid, 0 Refill(s).        ciprofloxacin 500 mg oral tablet: 500 mg, 1 tab(s), po, q12 hrs, 0 Refill(s).        ferrous sulfate 325 mg (65 mg elemental iron) oral tablet: 325 mg, 1 tab(s), po, daily, 0 Refill(s).        fluconazole 100 mg oral tablet: 100 mg, 1 tab(s), po, daily, 0 Refill(s).        Neurontin 600 mg oral tablet: 600 mg, 1 tab(s), PO, qid, 370 tab(s), 3 Refill(s).        Claritin 10 mg oral tablet: 10 mg, 1 tab(s), PO, Daily, for 10 day(s), 10 tab(s), 0 Refill(s).        Centrum oral tablet: 1 tab(s), PO, Daily, 90 tab(s).        nortriptyline 50 mg oral capsule: 50 mg, 1 cap(s), PO, daily, 95 cap(s), 3 Refill(s).        MiraLax oral powder for reconstitution: 17 gm, po, daily, 0 Refill(s).        prochlorperazine 10 mg oral tablet: 10 mg, 1 tab(s), po, q6 hrs, Per Hosp [facility] 3/11/2018, 0 Refill(s).        Zantac 300 oral tablet: 300 mg, 1 tab(s), PO, Once a day (at bedtime), Will try instead of omeprazole, 95 tab(s), 3 Refill(s).        Vitamin B1: 100 mg, daily.        valACYclovir 500 mg oral tablet: 500 mg, 1 tab(s), po, daily, 0 Refill(s).                Allergies    spironolactone (gynecomastia)

## 2022-02-16 NOTE — PROGRESS NOTES
Patient:   SANTOSH TEJADA            MRN: [id number]            FIN: [id number]               Age:   72 years     Sex:  Male     :  1945   Associated Diagnoses:   Liver Cirrhosis, Alcoholic; Osteopenia; Esophageal varices; Pruritus; Polyneuropathy; Lymphoplasmacytoid lymphoma, CLL   Author:   Dylon Evans MD      Visit Information      Date of Service: 2018 10:10 am  Performing Location: [facility]  Encounter#: [id number]      Primary Care Provider (PCP):  Dylon Evans MD    NPI# [id number]      Referring Provider:  Dylon Evans MD    NPI# [id number]      Chief Complaint   2018 10:13 AM CST   F/u liver U/S        History of Present Illness   Has progressive myelodysplastic dysplasia with 5-7% blasts on recent bone marrow biopsy. Will be getting daily injections for one week at a time each month. His cells are currently abnormal and immature.  Hemoglobin 6.7 today and getting a transfusion with two units.    Feeling better than two weeks ago with no fevers. Tolerated the Levaquin and tamiflu. Energy improved following the infusion two weeks ago although starting to feel weak in past few days.  Will get permanent front teeth.    Itching has been less. Minimal swelling in the legs since stopping the torsemide. Appetite is good.   Has some burning on the tips of the toes and feet. Neurontin and nortriptylline with benefit. Stopped lyrica due to expense.  Weight is stable.  Chronic hoarseness for years.      Review of Systems   Constitutional:  decreased energy.    Respiratory:  No shortness of breath.    Cardiovascular:  No chest pain, No peripheral edema.    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    Integumentary:  easy bruising and skin tears easily.    Neurologic:  No dizziness.    PHQ-9: 1pt (2017)  CAGE: 0pts (2017)      Health Status   Allergies:    Allergic Reactions (Selected)  Severity Not Documented  Spironolactone (Gynecomastia)    Medications:  (Selected)   Prescriptions  Prescribed  Neurontin 600 mg oral tablet: 1 tab(s) ( 600 mg ), PO, qid, # 370 tab(s), 3 Refill(s), Type: Maintenance, Pharmacy: [facility], 1 tab(s) po qid  Zantac 300 oral tablet: 1 tab(s) ( 300 mg ), PO, Once a day (at bedtime), Instructions: Will try instead of omeprazole, # 95 tab(s), 3 Refill(s), Type: Maintenance, Pharmacy: [facility], 1 tab(s) po hs,Instr:Will try instead of omeprazole  nortriptyline 50 mg oral capsule: 1 cap(s) ( 50 mg ), PO, daily, # 95 cap(s), 3 Refill(s), Type: Maintenance, Pharmacy: [facility], 1 cap(s) po daily  Documented Medications  Documented  CLA- Safflower oil: CLA- Safflower oil, ( 1,000 mg ), Supply, 0 Refill(s), Type: Maintenance  Centrum oral tablet: 1 tab(s), PO, Daily, # 90 tab(s), 0 Refill(s)  Claritin 10 mg oral tablet: 1 tab(s) ( 10 mg ), PO, Daily, # 10 tab(s), 0 Refill(s), Type: Maintenance  MiraLax oral powder for reconstitution: ( 17 gm ), po, daily, 0 Refill(s), Type: Maintenance  Vitamin B1: ( 100 mg ), daily, 0 Refill(s), Type: Maintenance  Vitamin C 1000 mg oral tablet: 1 tab(s) ( 1,000 mg ), po, daily, 0 Refill(s), Type: Maintenance  calcium (as carbonate) 600 mg oral tablet: 1 tab(s) ( 600 mg ), po, tid, 0 Refill(s), Type: Maintenance  ferrous sulfate 325 mg (65 mg elemental iron) oral tablet: 1 tab(s) ( 325 mg ), po, daily, 0 Refill(s), Type: Maintenance   Problem list:    All Problems  Esophageal varices / ICD-9-.1 / Confirmed  Hayfever / ICD-9-.9 / Confirmed  Osteopenia / ICD-9-CM V13.59 / Confirmed  Hypertension / SNOMED CT 6317523414 / Confirmed  Liver Cirrhosis, Alcoholic / ICD-9-.2 / Confirmed  Lymphoplasmacytoid lymphoma, CLL / SNOMED CT 945168828 / Confirmed  Neuropathy / ICD-9-.9 / Confirmed  Obesity / ICD-9-.00 / Probable  Prediabetes / ICD-9-.29 / Confirmed  Renal Insufficiency / ICD-9-.9 / Confirmed  Tubular Adenoma / ICD-9-.9 /  Confirmed  Inactive: Chemical Dependency / ICD-9-.90  Inactive: Malrotation of Intestine / ICD-9-.4  Inactive: Osteoporosis / ICD-9-.00  Resolved: Esophageal Ulcer / ICD-9-.20  Resolved: Inguinal Hernia / ICD-9-.90      Histories   Family History: Sister with SLE; Brother with seizure disorder; Dad with colon cancer; mom  cerebral aneurysm    Social History: Living in house by himself      Physical Examination   Vital Signs   2018 10:13 AM CST Temperature Tympanic 97.8 DegF  LOW    Peripheral Pulse Rate 76 bpm    Pulse Site Radial artery    HR Method Manual    Systolic Blood Pressure 118 mmHg    Diastolic Blood Pressure 56 mmHg  LOW    Mean Arterial Pressure 77 mmHg    BP Site Right arm    BP Method Manual      Measurements from flowsheet : Measurements   2018 10:13 AM CST Height Measured - Standard 68 in    Weight Measured - Standard 198 lb    BSA 2.07 m2    Body Mass Index 30.1 kg/m2  HI      General:  Alert and oriented, No acute distress.    Neck:  No lymphadenopathy.    Respiratory:  Lungs are clear to auscultation.    Cardiovascular:  Normal rate, Regular rhythm, No edema.    Musculoskeletal:  Normal strength, Normal gait, both hands with dupuytrens contractures.    Integumentary:  minimal bruising.    Extremities: minimal edema      Review / Management     HgbA1c: 5.8% (2018)   MELD score: 8  AFP: 1.2      Impression and Plan   Diagnosis     Liver Cirrhosis, Alcoholic (ITH48-CG K70.30).     Osteopenia (OEI21-BT M85.80).     Esophageal varices (UVW41-JJ I85.00).     Pruritus (TKW66-PP L29.9).     Polyneuropathy (CKG22-DQ G62.1).     Lymphoplasmacytoid lymphoma, CLL (GXW11-QM C83.00).       Pancytopenia: Lymphoplasmacytoid Stage IV and remission. Now has progressive myelodysplastic syndrome 5-7% blasts and pancytopenia. Will be starting Vidaza and getting transfusions per Hematology as needed.  Medications: prophylactically being put on fluconazole,  valcyclovir and ciprofloxacin daily with low WBC and neutrophils.    Liver Cirrhosis: weighed 188 lbs in April 2011. Weight prior to cirrhosis was 178. Current weight around 200 since Sept 2012. Gets liver ultrasound and AFP every 6 months ( February 2018). Instructed by GI not to follow ammonia level unless symptomatic. Will follow labs every 6 months. Discontinue torsemide and spironolactone 2017 and no significant increase in edema  Pruritis: stopped the naltrexone and doing fine  Osteopenia: diagnosed March 2010 with spine -4.9. Took fosamax. Recheck showed osteopenia  -1.76 in 2012 and so stopped fosamax. Recheck in 2015 showed -1.2 femur and 0.4 spine with planned recheck in 2020  Renal insufficiency: Resolved  GERD: ranitidine controlling the acid reflux (switched from omeprazole)    Foot Neuropathy: notriptylline and gabapentin. Stopped lyrica 2015 and doing well.  Left heel pain: diagnosed with plantar neuroma and doing well with new shoes  Colon cancer screen: adenoma 2005 and normal 2010. Benign Leiomyoma 2015 and repeat 2020  Prostate Cancer screening: not indicated  EGD: 2009 showed grade 0/1 esophageal varices and gastric portal hypertension. Repeat 2015 showed stable and consider repeat 2020  Immunizations: Pneumovax: 2009/2014. Adacel 2011. Prevnar 2015  Chronic hoarsemess: uses biotene and recommended ENT consult in past  Code Status: DNR

## 2022-02-16 NOTE — PROGRESS NOTES
Patient:   SANTOSH TEJADA            MRN: [id number]            FIN: [id number]               Age:   72 years     Sex:  Male     :  1945   Associated Diagnoses:   Liver Cirrhosis, Alcoholic; Osteopenia; Esophageal varices; Pruritus; Polyneuropathy; Lymphoplasmacytoid lymphoma, CLL   Author:   Dylon Evans MD      Visit Information      Date of Service: 2017 05:40 pm  Performing Location: [facility]  Encounter#: [id number]      Primary Care Provider (PCP):  Dylon Evans MD    NPI# [id number]      Referring Provider:  No referring provider recorded for selected visit.      Chief Complaint   2017 5:43 PM CDT    here to follow up labs      History of Present Illness     Had six teeth removed in past few months. No longer any facial swelling in past two weeks.  Labs have stabilized with WBC 1-4-2.0, Hgb 11.0-12.6 and Platelets 48-65/    Itching has been less. Minimal swelling in the legs since decreasing the torsemide. Energy and appetitie are good.   Has some burning on the tips of the toes and feet. Neurontin and nortriptylline with benefit. Stopped lyrica due to expense.  Weight is stable.  Chronic hoarseness for years.      Review of Systems   Constitutional:  naltrexone has helped the itching.    Respiratory:  No shortness of breath.    Cardiovascular:  No chest pain, No peripheral edema.    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    Integumentary:  easy bruising and skin tears easily.    Neurologic:  No dizziness.    PHQ-9: 1pt  CAGE: 0pts no alcohol      Health Status   Allergies:    Allergic Reactions (Selected)  Severity Not Documented  Spironolactone (Gynecomastia)   Medications:  (Selected)   Prescriptions  Prescribed  Demadex 10 mg oral tablet: 1 tab(s) ( 10 mg ), PO, daily, # 95 tab(s), 3 Refill(s), Type: Soft Stop, Pharmacy: [facility], 1 tab(s) po daily  K-Dur 20 oral tablet, extended release: 1 tab(s) ( 20 mEq ), po,  daily, # 95 tab(s), 3 Refill(s), Type: Soft Stop, Pharmacy: [email or web address], member number [id number], 1 tab(s) po daily  Neurontin 600 mg oral tablet: 1 tab(s) ( 600 mg ), PO, qid, # 370 tab(s), 3 Refill(s), Type: Maintenance, Pharmacy: [facility], 1 tab(s) po qid  Zantac 300 oral tablet: 1 tab(s) ( 300 mg ), PO, Once a day (at bedtime), Instructions: Will try instead of omeprazole, # 95 tab(s), 3 Refill(s), Type: Maintenance, Pharmacy: [facility], 1 tab(s) po hs,Instr:Will try instead of omeprazole  naltrexone 50 mg oral tablet: 1 tab(s) ( 50 mg ), po, daily, # 95 tab(s), 3 Refill(s), Type: Maintenance, Pharmacy: [facility], 1 tab(s) po daily  nortriptyline 50 mg oral capsule: 1 cap(s) ( 50 mg ), PO, daily, # 95 cap(s), 3 Refill(s), Type: Maintenance, Pharmacy: [facility], 1 cap(s) po daily  Documented Medications  Documented  CLA- Safflower oil: CLA- Safflower oil, ( 1,000 mg ), Supply, 0 Refill(s), Type: Maintenance  Centrum oral tablet: 1 tab(s), PO, Daily, # 90 tab(s), 0 Refill(s)  Citracal: 3 tab(s), po, daily, 0 Refill(s), Type: Maintenance  Claritin 10 mg oral tablet: 1 tab(s) ( 10 mg ), PO, Daily, # 10 tab(s), 0 Refill(s), Type: Maintenance  Iron: Iron, ( 65 mg ), po, daily, Supply, 0 Refill(s), Type: Maintenance  Vitamin B1: ( 100 mg ), daily, 0 Refill(s), Type: Maintenance  torsemide 10 mg oral tablet: 2 tab(s) ( 20 mg ), po, daily, 0 Refill(s), Type: Maintenance   Problem list:    All Problems  Esophageal varices / ICD-9-.1 / Confirmed  Hayfever / ICD-9-.9 / Confirmed  Osteopenia / ICD-9-CM V13.59 / Confirmed  Hypertension / SNOMED CT 1050997395 / Confirmed  Liver Cirrhosis, Alcoholic / ICD-9-.2 / Confirmed  Lymphoplasmacytoid lymphoma, CLL / SNOMED CT 498020598 / Confirmed  Neuropathy / ICD-9-.9 / Confirmed  Obesity / ICD-9-.00 / Probable  Prediabetes / ICD-9-.29 / Confirmed  Renal Insufficiency / ICD-9-.9 /  Confirmed  Tubular Adenoma / ICD-9-.9 / Confirmed  Inactive: Chemical Dependency / ICD-9-.90  Inactive: Malrotation of Intestine / ICD-9-.4  Inactive: Osteoporosis / ICD-9-.00  Resolved: Esophageal Ulcer / ICD-9-.20  Resolved: Inguinal Hernia / ICD-9-.90      Histories   Family History: Sister with SLE; Brother with seizure disorder; Dad with colon cancer; mom  cerebral aneurysm    Social History: Living in house by himself      Physical Examination   Vital Signs   2017 5:43 PM CDT Temperature Tympanic 97.7 DegF  LOW    Peripheral Pulse Rate 84 bpm    Systolic Blood Pressure 121 mmHg    Diastolic Blood Pressure 78 mmHg      General:  Alert and oriented, No acute distress.    Neck:  No lymphadenopathy.    Respiratory:  Lungs are clear to auscultation.    Cardiovascular:  Normal rate, Regular rhythm, No edema.    Musculoskeletal:  Normal strength, Normal gait, both hands with dupuytrens contractures.    Integumentary:  minimal bruising.       Review / Management     HgbA1c: 5.7% 2016   MELD score: 7  Recent: hgb 10.7 with PLT 57 and WBC 1.3      Impression and Plan   Diagnosis     Liver Cirrhosis, Alcoholic (VCI65-DQ K70.30).     Osteopenia (UUV08-DT M85.80).     Esophageal varices (NHH79-YM I85.00).     Pruritus (JQM98-KN L29.9).     Polyneuropathy (CHM29-DJ G62.1).     Lymphoplasmacytoid lymphoma, CLL (OQC39-QM C83.00).       Liver Cirrhosis: weighed 188 lbs in 2011. Taking the torsemide 20mg daily and spironolactone was stopped. Weight prior to cirrhosis was 178. Current weight around 200 since 2012. Gets liver ultrasound and AFP every 6 months ( 2017). Instructed by GI not to follow ammonia level unless symptomatic. Will follow labs every 6 months. Decreased torsemide (2017) to 10mg daily and doing well. May try going off torsemide for a week.  Pruritis: continue naltrexone. Working well enough. Not interested in a different  medication  Osteopenia: diagnosed March 2010 with spine -4.9. Took fosamax. Recheck showed osteopenia  -1.76 in 2012 and so stopped fosamax. Recheck in 2015 showed -1.2 femur and 0.4 spine with planned recheck in 2020  Hypokalemia: normal recently  Renal insufficiency: has stopped NSAIDs. Creatinine is stable with hospital labs  GERD: ranitidine controlling the acid reflux (switched from omeprazole)  Lymphoplasmacytoid: Stage IV and remission. Now has myelodysplastic syndrome and pancytopenia    Foot Neuropathy: notriptylline and gabapentin. Stopped lyrica 2015 and doing well. Pain worse at night.  Left heel pain: diagnosed with plantar neuroma and doing well with new shoes  Colon cancer screen: adenoma 2005 and normal 2010. Benign Leiomyoma 2015 and repeat 2020  Prostate Cancer screening: not indicated  EGD: 2009 showed grade 0/1 esophageal varices and gastric portal hypertension. Repeat 2015 showed stable and consider repeat 2020  Immunizations: Pneumovax: 2009/2014. Adacel 2011. Prevnar 2015  Chronic hoarsemess: uses biotene and recommend ENT consult  Code Status: full code but reassess frequently and pull plug if not improving

## 2022-02-16 NOTE — PROGRESS NOTES
Chief Complaint    Had fall last night. Hit right side of head. Injuries to hands, elbows, ear. Unsure about LOC.  History of Present Illness      Chief complaint as above reviewed and confirmed with patient.  Pt presents to the clinic with concerns re: injuries from a fall last night.  pt is a 72 year old male, accompanied by his daugther, who presents with concerns re: multliple wounds to the hands B.  he states he fell at home last night about midnight.  he got up in the middle of the night and fell but is really unable to give additional information.  he denies LOC but it was unwitnessed.  He has known lymphoplasmacytoid leukemia.  he is recieving chemotherapy for this.  most recently has been dealing with associated pancytopenia and extreemly weak. He has had moderate difficulty functioning at home where he lives alone due to excessive fatigue and weakness.  he receieved transfusions of PRBC and platelets last week on 2-27-18.  He has not felt much better following that transfusion.  This am the daughter arrived to the home to find her father with several skin tears, black and blue areas on the hands, swelling and blood throughout the house and brought him immediately to the clinic.  He denies any current TNW of upper or lower ext. has a headache he rates as 1/10 of severity frontal area.  no nausea, vomiting. no chest pain.  The amount of difficulty ambulating and amount of weakness per daughter report is consistent with what it has been the last 2 weeks.  Review of Systems      Review of systems is negative with the exception of those noted in HPI          Physical Exam   Vitals & Measurements    T: 98.5(Tympanic)  HR: 86(Peripheral)  BP: 128/70  SpO2: 98%     WT: 189.8 lb           Vitals as above per nursing documentation           Constitutional : nad appears ill, prefers to lay supine on exam table.           Ears: ears patent B, TMS intact, noninjected no hemotympanum, there is an area of ecchymosis R  mastoid region.  there is tenderness here.            Nose: nasal mucosa is non-ededmatous. no discharge           Throat: pharynx is nonerythematous, no tonsillar hypertrophy, no exudate           Neck: neck supple, no adenopathy, no thyromegaly, no rigidity , no midline cervical tenderness.           Lungs: lungs CTA', no Wheezes, rhonchi or rales           Heart: heart RRR, nl S1, S2 no murmur           skin:  No rashes, there is ecchymosis of the hand B, metacarpals dorsally as well as the 2-5 digits B with several skin tears present.  no active bleeding present.        PERRL      EOMI      muscular strength sensation and DTR are symmetrical and WNL for upper and lower ext B       Rhomburg not done due to patient fatigue, difficulty with ambulation and weakness.             Assessment/Plan       Hand pain         dressings placed after skin tears cleaned.  xrays ordered by not performed as pt was taken to the ED for management of his pancytopenia that was worsening and further evaluation of his head trauma.         Ordered:          94909 office outpatient visit 25 minutes (Charge), Quantity: 1, Head injury  Leukemia  Hand pain          Creatinine (Request), Priority: Urgent, Head injury  Leukemia  Hand pain                Head injury         head CT indicated given head trauma with thrombocytopenia.         Ordered:          [id number] office outpatient visit 25 minutes (Charge), Quantity: 1, Head injury  Leukemia  Hand pain          CBC w/o Diff (Request), Priority: Urgent, Leukemia  Head injury          Creatinine (Request), Priority: Urgent, Head injury  Leukemia  Hand pain                Leukemia         progressively worsening thrombocytopenia despite transfusions of RBC, platelets 4 days ago.  will need additional transfusions today.  Discussed with EDMD.  Agreeable to accept further evaluate pt.  If positive for intracranial hemorrhage may need transfer vs admission to [facility] may be indicated for  ongoing mangement of his pancytopenia, weakness, fatigue, and difficulty managing at home.         Ordered:          [id number] office outpatient visit 25 minutes (Charge), Quantity: 1, Head injury  Leukemia  Hand pain          CBC w/o Diff (Request), Priority: Urgent, Leukemia  Head injury          Creatinine (Request), Priority: Urgent, Head injury  Leukemia  Hand pain           Patient Information     Name:SANTOSH TEJADA      Address:      [address] [place] [id number]     Sex:Male     YOB: 1945     [phone]     Emergency Contact:MARIA T TEJADA     MRN:[id number]     FIN:[id number]     Location:[facility]     Date of Service:03/03/2018      Primary Care Physician:       Dylon Evans MD, [phone]  Problem List/Past Medical History    Ongoing     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     Esophageal varices     Hayfever     Hypertension      Comments: Due to Cirrhosis     Liver Cirrhosis, Alcoholic     Lymphoplasmacytoid lymphoma, CLL     Malrotation of Intestine     Neuropathy     Obesity     Osteopenia     Osteoporosis     Prediabetes     Renal Insufficiency     Tubular Adenoma    Historical     Esophageal Ulcer      Comments: With esophagitis; blood transfusion 2007     Inguinal Hernia  Procedure/Surgical History     Bone marrow biopsy (11/04/2016)     Colonoscopy (04/08/2015)     Esophagogastroduodenoscopy (04/05/2011)     Colonoscopy (02/23/2011)     Colonoscopy (02/23/2010)     Echocardiogram (10/2009)     Colonoscopy (09/2005)     Cataract extraction (12/2000)     Colonoscopy (1984)     Herniorrhaphy (1981)     Herniorrhaphy (1959)     Appendectomy (1957)     Repair procedure for malrotation of intestine  Medications     CLA- Safflower oil: 1,000 mg, 0 Refill(s).     Vitamin C 1000 mg oral tablet: 1,000 mg, 1 tab(s), po, daily, 0 Refill(s).     Vidaza: 75 mg/m2, subcutaneous, q 24 hrs, 0 Refill(s).     calcium (as carbonate)  600 mg oral tablet: 600 mg, 1 tab(s), po, tid, 0 Refill(s).     ciprofloxacin 500 mg oral tablet: 500 mg, 1 tab(s), po, q12 hrs, 0 Refill(s).     ferrous sulfate 325 mg (65 mg elemental iron) oral tablet: 325 mg, 1 tab(s), po, daily, 0 Refill(s).     fluconazole 100 mg oral tablet: 100 mg, 1 tab(s), po, daily, 0 Refill(s).     Neurontin 600 mg oral tablet: 600 mg, 1 tab(s), PO, qid, 370 tab(s), 3 Refill(s).     Claritin 10 mg oral tablet: 10 mg, 1 tab(s), PO, Daily, for 10 day(s), 10 tab(s), 0 Refill(s).     Centrum oral tablet: 1 tab(s), PO, Daily, 90 tab(s).     nortriptyline 50 mg oral capsule: 50 mg, 1 cap(s), PO, daily, 95 cap(s), 3 Refill(s).     MiraLax oral powder for reconstitution: 17 gm, po, daily, 0 Refill(s).     Zantac 300 oral tablet: 300 mg, 1 tab(s), PO, Once a day (at bedtime), Will try instead of omeprazole, 95 tab(s), 3 Refill(s).     Vitamin B1: 100 mg, daily.     valACYclovir 500 mg oral tablet: 500 mg, 1 tab(s), po, daily, 0 Refill(s).      Allergies    spironolactone (gynecomastia)  Social History    Smoking Status - 02/14/2018     Never smoker     Alcohol - Past, 03/08/2012      Past     Employment and Education - 03/08/2012      Retired     Exercise and Physical Activity - Does not exercise, 03/08/2012     Tobacco - Denies Tobacco Use, 03/08/2012      Never  Family History    CA - Cancer of colon: Father.    Cerebral aneurysm: Mother.    Diabetes mellitus: Brother.    Hypertension: Sister.    Meningitis: Brother.    Rheumatic fever: Sister.    Seizure disorder: Brother.    Systemic Lupus Erythematosus: Sister.  Immunizations      Vaccine Date Status Comments      influenza virus vaccine, inactivated 08/30/2017 Given      influenza virus vaccine, inactivated 11/03/2016 Given      hepatitis B adult vaccine 03/30/2016 Given      influenza virus vaccine, inactivated 10/13/2015 Given      hepatitis A adult vaccine 10/13/2015 Given      pneumococcal (PCV13) 10/13/2015 Given      hepatitis B adult  vaccine 10/13/2015 Given      hepatitis B adult vaccine 04/13/2015 Given      hepatitis B adult vaccine 03/11/2015 Given      hepatitis A adult vaccine 03/11/2015 Given      pneumococcal (PPSV23) 09/11/2014 Given      influenza virus vaccine, inactivated 09/11/2014 Given      tetanus/diphth/pertuss (Tdap) adult/adol 12/22/2011 Given Patient gave verbal consent.      influenza 09/29/2009 Recorded      pneumococcal (PPSV23) 09/01/2009 Recorded      Td 11/12/2002 Recorded      Td 11/01/1992 Recorded  Lab Results   Results (Last 90 days)             Laboratory                  Chemistry                       General Chemistry                            A/G Ratio:      H 2.6  (Range 1.0 - 2.5)  (02/06/18 09:56 AM CST)                                                                                                                                       AGAP:      12   (01/24/18 01:48 PM CST)                                                                                                                                       ALT/SGPT:      14 unit/L  (02/06/18 09:56 AM CST)                                                                                                                                       AST/SGOT:      15 unit/L  (02/06/18 09:56 AM CST)                                                                                                                                       Albumin Level:      4.2 gm/dL  (02/06/18 09:56 AM CST)                                                                                                                                       Alkaline Phosphatase:      64 unit/L  (02/06/18 09:56 AM CST)                                                                                                                                       BUN                                  18 mg/dL  (01/24/18 01:48 PM CST)                                                                                                                                              14 mg/dL  (02/06/18 09:56 AM CST)                                                                                                                                       BUN/Creat Ratio                                  16   (01/24/18 01:48 PM CST)                                                                                                                                             NOT APPLICABLE   (02/06/18 09:56 AM CST)                                                                                                                                       Basic Metabolic Profile:         (02/06/18 09:56 AM CST)                                                                                                                                       Bilirubin Direct:      0.1 mg/dL  (02/06/18 09:56 AM CST)                                                                                                                                       Bilirubin Indirect:      0.5   (02/06/18 09:56 AM CST)                                                                                                                                       Bilirubin Total:      0.6 mg/dL  (02/06/18 09:56 AM CST)                                                                                                                                       CO2 Level                                  25 mmol/L  (01/24/18 01:48 PM CST)                                                                                                                                             25 mmol/L  (02/06/18 09:56 AM CST)                                                                                                                                       Calcium Level                                  9.7 mg/dL  (01/24/18 01:48 PM CST)                                                                                                                                              L 8.5 mg/dL (Range 8.6 - 10.3)  (02/06/18 09:56 AM CST)                                                                                                                                       Chloride Level                                  104 mmol/L  (01/24/18 01:48 PM CST)                                                                                                                                             100 mmol/L  (02/06/18 09:56 AM CST)                                                                                                                                       Creatinine Level                                  1.14 mg/dL  (01/24/18 01:48 PM CST)                                                                                                                                             1.11 mg/dL  (02/06/18 09:56 AM CST)                                                                                                                                                (03/03/18 01:44 PM CST)                                                                                                                                             H 1.28 mg/dL (Range 0.72 - 1.25)  (03/03/18 01:44 PM CST)                                                                                                                                       Globulin:      L 1.6  (Range 1.9 - 3.7)  (02/06/18 09:56 AM CST)                                                                                                                                       Glucose Level                                  100 mg/dL  (01/24/18 01:48 PM CST)                                                                                                                                             H 116 mg/dL (Range 65 - 99)  (02/06/18 09:56 AM CST)                                                                                                                                        Hgb A1c                                     (02/06/18 09:56 AM CST)                                                                                                                                             H 5.8  (Range  - <5.7)  (02/06/18 09:56 AM CST)                                                                                                                                       Potassium Level                                  4.5 mmol/L  (01/24/18 01:48 PM CST)                                                                                                                                             3.9 mmol/L  (02/06/18 09:56 AM CST)                                                                                                                                       Protein Total                                     (02/06/18 09:56 AM CST)                                                                                                                                             L 5.8 gm/dL (Range 6.1 - 8.1)  (02/06/18 09:56 AM CST)                                                                                                                                       Sodium Level                                  141 mmol/L  (01/24/18 01:48 PM CST)                                                                                                                                             L 134 mmol/L (Range 135 - 146)  (02/06/18 09:56 AM CST)                                                                                                                                       eGFR:      67 mL/min/1.73m2  (02/06/18 09:56 AM CST)                                                                                                                                       eGFR                                   >60   (01/24/18 01:48 PM CST)                                                                                                                                              78 mL/min/1.73m2  (02/06/18 09:56 AM CST)                                                                                                                                             >60   (03/03/18 01:44 PM CST)                                                                                                                                       eGFR Non-                                  >60   (01/24/18 01:48 PM CST)                                                                                                                                             L 56  (Range >60 - )  (03/03/18 01:44 PM CST)                                                                                                                                  Pregnancy Testing                            AFP                                     (02/06/18 09:56 AM CST)                                                                                                                                             1.2 ng/mL  (02/06/18 09:56 AM CST)                                                                                                                             Coagulation                       INR                            INR:      1.1   (02/06/18 09:56 AM CST)                                                                                                                                  PT                            PT                                     (02/06/18 09:56 AM CST)                                                                                                                                             11.1   (02/06/18 09:56 AM CST)                                                                                                                             Hematology                       CBC                            CBC w/Diff:          (02/06/18 09:56 AM CST)                                                                                                                                       Hct                                  L 23.7 % (Range 37.0 - 53.0)  (01/26/18 08:29 AM CST)                                                                                                                                             L 16.3 % (Range 37.0 - 53.0)  (01/24/18 01:48 PM CST)                                                                                                                                             L 17.7 % (Range 38.5 - 50.0)  (02/06/18 09:56 AM CST)                                                                                                                                             L 15.6 % (Range 37.0 - 53.0)  (03/03/18 01:44 PM CST)                                                                                                                                       Hgb                                  L 8.2 g/dL (Range 13.5 - 17.5)  (01/26/18 08:29 AM CST)                                                                                                                                             L 5.5 g/dL (Range 13.5 - 17.5)  (01/24/18 01:48 PM CST)                                                                                                                                             L 6.2 gm/dL (Range 13.2 - 17.1)  (02/06/18 09:56 AM CST)                                                                                                                                             L 5.4 g/dL (Range 13.5 - 17.5)  (03/03/18 01:44 PM CST)                                                                                                                                       MCH                                  H 36.9 pg (Range 26.0 - 34.0)  (01/26/18 08:29 AM CST)                                                                                                                                              H 39.6 pg (Range 26.0 - 34.0)  (01/24/18 01:48 PM CST)                                                                                                                                             H 35.8 pg (Range 27.0 - 33.0)  (02/06/18 09:56 AM CST)                                                                                                                                             30.3 pg  (03/03/18 01:44 PM CST)                                                                                                                                       MCHC                                  34.6 g/dL  (01/26/18 08:29 AM CST)                                                                                                                                             33.7 g/dL  (01/24/18 01:48 PM CST)                                                                                                                                             35.0 gm/dL  (02/06/18 09:56 AM CST)                                                                                                                                             34.6 g/dL  (03/03/18 01:44 PM CST)                                                                                                                                       MCV                                  H 107 fL (Range 80 - 100)  (01/26/18 08:29 AM CST)                                                                                                                                             H 117 fL (Range 80 - 100)  (01/24/18 01:48 PM CST)                                                                                                                                             H 102.3 fL (Range 80.0 - 100.0)  (02/06/18 09:56 AM CST)                                                                                                                                              88 fL  (03/03/18 01:44 PM CST)                                                                                                                                       MPV                                   fL  (01/26/18 08:29 AM CST)                                                                                                                                              fL  (01/24/18 01:48 PM CST)                                                                                                                                             See comment fL  (02/06/18 09:56 AM CST)                                                                                                                                              fL  (03/03/18 01:44 PM CST)                                                                                                                                       Platelet                                  L 26  (Range 140 - 440)  (01/26/18 08:29 AM CST)                                                                                                                                             L 26  (Range 140 - 440)  (01/24/18 01:48 PM CST)                                                                                                                                             L 15  (Range 140 - 400)  (02/06/18 09:56 AM CST)                                                                                                                                             L 2  (Range 140 - 440)  (03/03/18 01:44 PM CST)                                                                                                                                       RBC                                  L 2.22  (Range 4.30 - 5.90)  (01/26/18 08:29 AM CST)                                                                                                                                             L 1.39  (Range 4.30  - 5.90)  (01/24/18 01:48 PM CST)                                                                                                                                             L 1.73  (Range 4.20 - 5.80)  (02/06/18 09:56 AM CST)                                                                                                                                             L 1.78  (Range 4.30 - 5.90)  (03/03/18 01:44 PM CST)                                                                                                                                       RDW                                  H 20.0 % (Range 11.5 - 15.5)  (01/26/18 08:29 AM CST)                                                                                                                                             H 15.6 % (Range 11.5 - 15.5)  (01/24/18 01:48 PM CST)                                                                                                                                             H 18.9 % (Range 11.0 - 15.0)  (02/06/18 09:56 AM CST)                                                                                                                                             15.4 %  (03/03/18 01:44 PM CST)                                                                                                                                       WBC                                  L 1.4  (Range 4.5 - 11.0)  (01/26/18 08:29 AM CST)                                                                                                                                             L 1.1  (Range 4.5 - 11.0)  (01/24/18 01:48 PM CST)                                                                                                                                             L 1.0  (Range 3.8 - 10.8)  (02/06/18 09:56 AM CST)                                                                                                                                             L  0.2  (Range 4.5 - 11.0)  (03/03/18 01:44 PM CST)                                                                                                                                  Differential                            Abs Basophils                                  0.0   (01/24/18 01:48 PM CST)                                                                                                                                             0.0   (01/26/18 08:29 AM CST)                                                                                                                                       Abs Eosinophils                                  0.0   (01/24/18 01:48 PM CST)                                                                                                                                             0.0   (01/26/18 08:29 AM CST)                                                                                                                                       Abs Lymphocytes                                  L 0.4  (Range 0.9 - 2.9)  (01/24/18 01:48 PM CST)                                                                                                                                             L 0.7  (Range 0.9 - 2.9)  (01/26/18 08:29 AM CST)                                                                                                                                       Abs Metamyelocytes Man:      H 0.0  (Range  - <0.0)  (01/24/18 01:48 PM CST)                                                                                                                                       Abs Monocytes                                  0.2   (01/24/18 01:48 PM CST)                                                                                                                                             0.2   (01/26/18 08:29 AM CST)                                                                                                                                        Abs Neutrophils                                  L 0.5  (Range 1.7 - 7.0)  (01/24/18 01:48 PM CST)                                                                                                                                             L 0.5  (Range 1.7 - 7.0)  (01/26/18 08:29 AM CST)                                                                                                                                       Basophils                                  0.0 %  (01/24/18 01:48 PM CST)                                                                                                                                             0.0 %  (01/26/18 08:29 AM CST)                                                                                                                                       Blasts Man                                  0.0 %  (01/24/18 01:48 PM CST)                                                                                                                                             0.0 %  (01/26/18 08:29 AM CST)                                                                                                                                       Eosinophils                                  0.0 %  (01/24/18 01:48 PM CST)                                                                                                                                             0.0 %  (01/26/18 08:29 AM CST)                                                                                                                                       Lymphocytes                                  38.0 %  (01/24/18 01:48 PM CST)                                                                                                                                             H 48.0 % (Range 20.0 - 44.0)  (01/26/18 08:29 AM CST)                                                                                                                                        Metamyelocytes Man                                  H 2.0 % (Range  - <0.2)  (01/24/18 01:48 PM CST)                                                                                                                                             0.0 %  (01/26/18 08:29 AM CST)                                                                                                                                       Monocytes                                  H 16.0 % (Range  - <12.0)  (01/24/18 01:48 PM CST)                                                                                                                                             H 14.0 % (Range  - <12.0)  (01/26/18 08:29 AM CST)                                                                                                                                       Myelocytes Man                                  0.0 %  (01/24/18 01:48 PM CST)                                                                                                                                             0.0 %  (01/26/18 08:29 AM CST)                                                                                                                                       Neutrophils                                  44.0 %  (01/24/18 01:48 PM CST)                                                                                                                                             L 38.0 % (Range 42.0 - 72.0)  (01/26/18 08:29 AM CST)                                                                                                                                       Other Cells Man                                  0.0 %  (01/24/18 01:48 PM CST)                                                                                                                                             0.0 %  (01/26/18 08:29 AM CST)                                                                                                                                        Plasma Cells                                  0.0 %  (01/24/18 01:48 PM CST)                                                                                                                                             0.0 %  (01/26/18 08:29 AM CST)                                                                                                                                       Platelet Estimate                                     (01/24/18 01:48 PM CST)                                                                                                                                             Decreased   (01/24/18 01:48 PM CST)                                                                                                                                                (01/26/18 08:29 AM CST)                                                                                                                                             Decreased   (01/26/18 08:29 AM CST)                                                                                                                                                (03/03/18 01:44 PM CST)                                                                                                                                             Decreased   (03/03/18 01:44 PM CST)                                                                                                                                       Promyelocytes Man                                  0.0 %  (01/24/18 01:48 PM CST)                                                                                                                                             0.0 %  (01/26/18 08:29 AM CST)                                                                                                                                   Morphology                            Elliptocytes                                  Few   (01/26/18 08:29 AM CST)                                                                                                                                             Few   (03/03/18 01:44 PM CST)                                                                                                                                       Hypochromia:      Slight   (01/24/18 01:48 PM CST)                                                                                                                                       Plt Large:      Present   (01/24/18 01:48 PM CST)                                                                                                                                       Polychromasia:      Slight   (03/03/18 01:44 PM CST)                                                                                                                                       RBC Comments                                  Present   (01/24/18 01:48 PM CST)                                                                                                                                             Present   (01/26/18 08:29 AM CST)                                                                                                                                             Present   (03/03/18 01:44 PM CST)                                                                                                                                       RBC Morphology                                     (01/24/18 01:48 PM CST)                                                                                                                                                (01/26/18 08:29 AM CST)                                                                                                                                                (03/03/18 01:44 PM CST)                                                                                                                                        Tear Drop Cells:      Few   (03/03/18 01:44 PM CST)                                                                                                                                  Other Hematology                            Hematology Comments:      Reviewed by DM on 1/26/2018   (01/24/18 01:48 PM CST)                                                                                                                             Immunology/Serology                       Immunology General                            Influenza A                                     (01/24/18 03:15 PM CST)                                                                                                                                             NEGATIVE   (01/24/18 03:15 PM CST)                                                                                                                                       Influenza B:      NEGATIVE   (01/24/18 03:15 PM CST)                                                                                                                             Microbiology                       Bacteriology                            Culture Blood                                     (01/24/18 01:37 PM CST)                                                                                                                                             See comment   (01/24/18 01:37 PM CST)                                                                                                                             Urinalysis                       UA Dipstick                            UA Bilirubin:      NEGATIVE   (01/24/18 01:38 PM CST)                                                                                                                                       UA Glucose:      NEGATIVE    (01/24/18 01:38 PM CST)                                                                                                                                       UA Ketones:      NEGATIVE   (01/24/18 01:38 PM CST)                                                                                                                                       UA Leukocyte Esterase:      NEGATIVE   (01/24/18 01:38 PM CST)                                                                                                                                       UA Nitrite:      NEGATIVE   (01/24/18 01:38 PM CST)                                                                                                                                       UA Protein:      NEGATIVE   (01/24/18 01:38 PM CST)                                                                                                                                       UA Specific Gravity:      1.020   (01/24/18 01:38 PM CST)                                                                                                                                       UA pH                                     (01/24/18 01:38 PM CST)                                                                                                                                             H 8.5  (Range 5.0 - 8.0)  (01/24/18 01:38 PM CST)                                                                                                                                       Urine Occult Blood:      NEGATIVE   (01/24/18 01:38 PM CST)

## 2022-02-16 NOTE — PROGRESS NOTES
Chief Complaint    Patient is here for right hand. Requesting evaluation for referral to [facility]. Fell and landed on hand tuesday night and was seen in [facility]. Would also like results reviewed from US on left thigh.  History of Present Illness      patient present to clinic today for follow up from the ER.  ER discharge summary reviewed.  He fell 2 days ago coming out of 5 guys and now has a right hand degloving injury.  He reports that there is some nonspecific because under the whole band of the right hand and that sometimes the blood.  He says that the tendon was visible.  ER discharge summary shows that the tendons were exposed to the fascia was intact.  Patient also has CLL and is neutropenic and is on antibiotics for prophylaxis.  He was not given any additional antibiotics at the time of discharge.  There is a cockup wrist present and then a flat arm board is held in place by an ace wrap to prevent him from fully flexing his fingers.  He reports he was told that if he flexes his fingers that it would tear any healing that his skin had started.       He also has a large painful mass at his posterior upper left leg.  His primary care provider has ordered an ultrasound to image this.  It does show a well-defined mass.  Patient reports that it is tender and would like to have it excised.  We discussed that I am not sure if this is going to be a job her general surgeon or an orthopedic surgeon.  As patient will need to be seen by a hand surgeon today would recommend that he talk with the hand surgeon to see if he would feel comfortable helping patient with this or he felt that 1 of the group would be the right person or if he felt that patient should be seen by general surgery.       Review of systems is negative except as per HPI including:  no fevers, chills, sore throat, runny nose, nausea, vomiting, constipation, diarrhea, rash, chest pain, palpitations, slurred speech, new paresthesia, shortness of breath or  wheeze.       Exam:       General: alert and oriented ×3 no acute distress.       HEENT: pupils are equal round and reactive to light extraocular motion is intact. Normocephalic and atraumatic.        Hearing is grossly normal and there is no otorrhea.        Nares are patent there is no rhinorrhea.        Mucous membranes are moist and pink.       Chest: has bilateral rise with no increased work of breathing.       Cardiovascular: normal perfusion and brisk capillary refill.       Musculoskeletal: several areas of ecchymosis,       The right and Ace wrap and LAT ordered were removed then the splint was removed.  The Covan seem to be adherent to the patient with dry blood that had come through the dressing.  The exposed fingers were becoming red and swollen and warm to the touch.  With patient that there was probably an infection and that I felt that patient needed to be evaluated by hand surgery did not want to remove the rest of the dressing at this time in case he started to have increased bleeding that was poorly controlled before we could get him in to see the hand surgeon.  Also palpated large approximately 6 cm mass on the patient's left posterior proximal thigh.  Overlying skin there was not red or inflamed.Y       Neuro: no gross focal abnormalities and memory seems intact.       Psychiatric: speech is clear and coherent and fluent. Patient dressed appropriately for the weather. Mood is appropriate and affect is full.                          Physical Exam   Vitals & Measurements    T: 97.9   F (Tympanic)  HR: 96(Peripheral)  BP: 108/64     WT: 176.8 lb   Assessment/Plan       Degloving injury of hand (S62.551P)        Spoke with Dr. Green with [facility] orthopedics who initially recommended patient have follow-up with hand surgeon but then had his assistant call me to let me know that the hand surgeon was overbooked today for patient to go to the emergency room in [place] and if the emergency room  physician felt like an incision and drainage needed to be done then Dr. Green would be able to do that.  Tried to clarify if that was what they felt was most appropriate for patient to do today, especially given the history of patient being neutropenic with his history of CLL.  However Dr. Green PA said they could not give any recommendations without seeing the patient and the only way that they would see the patient would be if the patient went to the emergency room in [place] and the emergency room physician paged them.  Discussed this with patient and was planning to [facility] to [facility] however we then received a call back from Dr. Mancera's assistant who said that they would be able to see patient today at 1230.  Patient last ate at 6:00 this morning.  Instructed patient to remain n.p.o. until he was further evaluated.  Patient voiced understanding and said that he knew where the [facility] group was located at the [facility] and drove himself by private vehicle to his appointment.  Patient also reports that his last tetanus shot was 1 month ago.         Orders:          Referral (Request), 07/05/18 10:48:00 CDT, Referred to: Orthopaedics, Degloving injury of hand                Leg mass (R22.40)        Patient provided with referral today to take with him to his appointment.  If he discovers that or if it was not the right specialty have asked him to call us back and let us know so we can get him into general surgery for further evaluation and treatment of his leg mass.         Orders:          Referral (Request), 07/05/18 10:46:00 CDT, Referred to: Orthopaedics, Leg mass               25 minutes spent with patient in direct face to face contact, > 50% of time spent counseling and coordinating care.   Patient Information     Name:SANTOSH TEJADA      Address:      [address] [place] [id number]     Sex:Male     YOB: 1945      [phone]     Emergency Contact:BERNIE CARRASCO     MRN:[id number]     FIN:[id number]     Location:[facility]     Date of Service:07/05/2018      Primary Care Physician:       Dylon Evans MD, [phone]      Attending Physician:       Eugenia Turner MD, [phone]  Problem List/Past Medical History    Ongoing     Chemical dependency       Comments: leading to liver failure     Chemotherapy-induced neutropenia     Chronic subdural hematoma     Esophageal varices     Hayfever     History of osteopenia     Hypertensive disorder       Comments: Due to Cirrhosis     Liver cirrhosis, alcoholic     Lymphoplasmacytoid lymphoma, CLL     Malrotation of intestine     Myelodysplastic syndrome     Neuropathy     Neutropenic fever     Obesity     Osteoporosis     Pancytopenia     Prediabetes     Renal insufficiency     Tubular adenoma    Historical     Esophageal ulcer       Comments: With esophagitis; blood transfusion 2007     Inguinal hernia     Inpatient stay       Comments: @[facility], [place] - neutropenic fever     Inpatient stay       Comments: @[place], [place] - Myelodysplastic syndrome with persistent febrile neutropenia.     Inpatient stay       Comments: @[place], [place] - Neutropenic fever  Procedure/Surgical History     Bone marrow biopsy (06/11/2018)           Bone marrow biopsy (06/01/2018)           Bone marrow biopsy (11/04/2016)            Comments:      Indication: Lymphoma.     Colonoscopy (04/08/2015)            Comments:      Sedation: Propofol      Indication: history of tubular adenoma      Findings: Diverticulosis of the sigmoid and ascending colon. Benign leiomyoma.      F/U: Repeat colonoscopy in 5 years     Esophagogastroduodenoscopy (04/05/2011)            Comments:      Small esophageal varices; portal hypertensive gastropathy; small hiatal hernia     Colonoscopy (02/23/2011)           Colonoscopy (02/23/2010)             Comments:      normal      IV sedation used.      Repeat Colonoscopy in 5 years     Echocardiogram (10/2009)            Comments:      EF 65%     Colonoscopy (09/2005)            Comments:      Tubular adenoma     Cataract extraction (12/2000)           Colonoscopy (1984)           Herniorrhaphy (1981)           Herniorrhaphy (1959)            Comments:      right     Appendectomy (1957)           Repair procedure for malrotation of intestine        Medications     Vitamin B1: 100 mg, daily.     Centrum oral tablet: 1 tab(s), PO, Daily, 90 tab(s).     Claritin 10 mg oral tablet: 10 mg, 1 tab(s), PO, Daily, for 10 day(s), 10 tab(s), 0 Refill(s).     calcium (as carbonate) 600 mg oral tablet: 600 mg, 1 tab(s), po, tid, 0 Refill(s).     MiraLax oral powder for reconstitution: 17 gm, po, daily, 0 Refill(s).     Zantac 300 oral tablet: 300 mg, 1 tab(s), PO, Once a day (at bedtime), Will try instead of omeprazole, 95 tab(s), 3 Refill(s).     nortriptyline 50 mg oral capsule: 50 mg, 1 cap(s), PO, daily, 95 cap(s), 3 Refill(s).     Neurontin 600 mg oral tablet: 600 mg, 1 tab(s), PO, qid, 370 tab(s), 3 Refill(s).     Vitamin C 1000 mg oral tablet: 1,000 mg, 1 tab(s), po, daily, 0 Refill(s).     prochlorperazine 10 mg oral tablet: 10 mg, 1 tab(s), po, q6 hrs, Per [facility] 3/11/2018, 0 Refill(s).     Vitamin K-2: take 100 mcg once daily, 0 Refill(s).     Magic Mouthwash: take 10 mL by mouth 4 times daily if needed. Swish and spit for mouth sores, garle for sore throat, 0 Refill(s).     predniSONE 20 mg oral tablet: See Instructions, 2 tab(s) po daily 5 day(s), then 1 tab daily x 5 days, then 0.5 tab daily x 6 days, 0 Refill(s).     voriconazole 200 mg oral tablet: 200 mg, 1 tab(s), po, q12 hrs, for 21 day(s), start date 6/13/18 end date 7/4/18 per [employer], 0 Refill(s).          Allergies    spironolactone (gynecomastia)  Social History    Smoking Status - 07/05/2018     Never smoker     Alcohol - Past, 03/08/2012      Past,  03/08/2012     Employment and Education      Retired, 03/09/2011     Exercise and Physical Activity - Does not exercise, 03/08/2012     Tobacco - Denies Tobacco Use, 03/15/2010      Never, 03/08/2012  Family History    CA - Cancer of colon: Father.    Cerebral aneurysm: Mother.    Diabetes mellitus: Brother.    Hypertension: Sister.    Meningitis: Brother.    Rheumatic fever: Sister.    Seizure disorder: Brother.    Systemic Lupus Erythematosus: Sister.  Immunizations      Vaccine Date Status Comments      influenza virus vaccine, inactivated 08/30/2017 Given      influenza virus vaccine, inactivated 11/03/2016 Given      hepatitis B adult vaccine 03/30/2016 Given      influenza virus vaccine, inactivated 10/13/2015 Given      hepatitis A adult vaccine 10/13/2015 Given      pneumococcal (PCV13) 10/13/2015 Given      hepatitis B adult vaccine 10/13/2015 Given      hepatitis B adult vaccine 04/13/2015 Given      hepatitis B adult vaccine 03/11/2015 Given      hepatitis A adult vaccine 03/11/2015 Given      pneumococcal (PPSV23) 09/11/2014 Given      influenza virus vaccine, inactivated 09/11/2014 Given      tetanus/diphth/pertuss (Tdap) adult/adol 12/22/2011 Given Patient gave verbal consent.      influenza 09/29/2009 Recorded      pneumococcal (PPSV23) 09/01/2009 Recorded      Td 11/12/2002 Recorded      Td 11/01/1992 Recorded  Lab Results       Lab Results (Last 4 results within 90 days)        WBC: 2.0 Low [4.5  - 11] (06/14/18 14:44:00 CDT)       RBC: 3.06 Low [4.3  - 5.9] (06/14/18 14:44:00 CDT)       Hgb: 9.1 Low [13.5 g/dL - 17.5 g/dL] (06/14/18 14:44:00 CDT)       Hct: 26.8 Low [37 % - 53 %] (06/14/18 14:44:00 CDT)       MCV: 88 [80 fL - 100 fL] (06/14/18 14:44:00 CDT)       MCH: 29.7 [26 pg - 34 pg] (06/14/18 14:44:00 CDT)       MCHC: 34.0 [32 g/dL - 36 g/dL] (06/14/18 14:44:00 CDT)       RDW: 12.8 [11.5 % - 15.5 %] (06/14/18 14:44:00 CDT)       Platelet: 29 Low [140  - 440] (06/14/18 14:44:00 CDT)       MPV:  10.9 [6.5 fL - 11 fL] (06/14/18 14:44:00 CDT)       Metamyelocytes Man: 0.0 [80 fL - 100 fL] (06/14/18 14:44:00 CDT)       Myelocytes Man: 0.0 [6.5 fL - 11 fL] (06/14/18 14:44:00 CDT)       Promyelocytes Man: 0.0 [140  - 440] (06/14/18 14:44:00 CDT)       Blasts Man: 0.0 (06/14/18 14:44:00 CDT)       Other Cells Man: 0.0 [0.9  - 2.9] (06/14/18 14:44:00 CDT)       Lymphocytes: 29.0 [4.5  - 11] (06/14/18 14:44:00 CDT)       Abs Lymphocytes: 0.6 Low [0.9  - 2.9] (06/14/18 14:44:00 CDT)       Neutrophils: 43.0 [0.9  - 2.9] (06/14/18 14:44:00 CDT)       Abs Neutrophils: 0.9 Low [1.7  - 7] (06/14/18 14:44:00 CDT)       Monocytes: 28.0 [4.5  - 11] (06/14/18 14:44:00 CDT)       Abs Monocytes: 0.6 [0.9  - 2.9] (06/14/18 14:44:00 CDT)       Eosinophils: 0.0 [4.5  - 11] (06/14/18 14:44:00 CDT)       Abs Eosinophils: 0.0 [0.9  - 2.9] (06/14/18 14:44:00 CDT)       Basophils: 0.0 [4.5  - 11] (06/14/18 14:44:00 CDT)       Abs Basophils: 0.0 [0.9  - 2.9] (06/14/18 14:44:00 CDT)       Plasma Cells: 0.0 [0.9  - 2.9] (06/14/18 14:44:00 CDT)       NRBCs/100 WBC: 3.0 [4.5  - 11] (06/14/18 14:44:00 CDT)       NRBCs Abs#: 0.1 [4.5  - 11] (06/14/18 14:44:00 CDT)       Platelet Estimate: Decreased [0.9  - 2.9] (06/14/18 14:44:00 CDT)       Basophillic Stippling: Present [4.5  - 11] (06/14/18 14:44:00 CDT)       RBC Comments: Present [0.9  - 2.9] (06/14/18 14:44:00 CDT)

## 2022-02-16 NOTE — PROGRESS NOTES
Chief Complaint    F/u anemia  History of Present Illness      Feels less fatigued today following the blood transfusions two days ago. Eating and drinking good. Has more energy. Has not noticed any fevers. Tolerating levaquin and tamiflu.  Review of Systems      No shortness of breath       No vomiting       Not significant bruising.  Physical Exam   Vitals & Measurements    T: 97.9(Tympanic)  HR: 78(Peripheral)  BP: 144/68     HT: 68 in  WT: 209.4 lb  BMI: 31.84       General: No acute distress.      Skin: Bruising from IV placement and blood draws      Neck: No lymphadenopathy.      Respiratory: Lungs are clear to auscultation.      Cardiovascular: Normal rate, Regular rhythm.      Musculoskeletal: Normal gait.       Hemoglobin: 8.2 (improved from 5.5 following transfusions two days ago)       Platelets: 20  Assessment/Plan   Anemia: Possibly combination of myelodysplastic syndrome with acute infectious process.  Being treated with Tamiflu and Levaquin.  Afebrile today.  Looking better. Seen by Hematology today as well and will be getting a bone marrow biopsy soon. Follow up if not improving.    Cirrhosis: Has been treated with torsemide and spironolactone for many years.  Weaned off the spironolactone and torsemide.  Has been on potassium because of hypokalemia most likely related to torsemide.  Will try stopping the potassium and recheck in a couple weeks.  His weight is increased from last year but otherwise tolerating it.  May have to restart torsemide if continues to gain weight and became symptomatic.  Patient Information     Name:SANTOSH TEJADA      Address:      [address] [place] [id number]     Sex:Male     YOB: 1945     [phone]     MRN:[id number]     FIN:[id number]     Location:[facility]     Date of Service:01/26/2018      Primary Care Physician:       Dylon Evans MD, [phone]  Medications        Iron: 65 mg, po, daily, 0 Refill(s).         CLA- Safflower oil: 1,000 mg, 0 Refill(s).        calcium (as carbonate) 600 mg oral tablet: 600 mg, 1 tab(s), po, tid, 0 Refill(s).        ferrous sulfate 325 mg (65 mg elemental iron) oral tablet: 325 mg, 1 tab(s), po, daily, 0 Refill(s).        Neurontin 600 mg oral tablet: 600 mg, 1 tab(s), PO, qid, 370 tab(s), 3 Refill(s).        Levaquin 750 mg oral tablet: 750 mg, 1 tab(s), PO, q24hr, for 7 day(s), 7 tab(s), 0 Refill(s).        Claritin 10 mg oral tablet: 10 mg, 1 tab(s), PO, Daily, for 10 day(s), 10 tab(s), 0 Refill(s).        Centrum oral tablet: 1 tab(s), PO, Daily, 90 tab(s).        naltrexone 50 mg oral tablet: 50 mg, 1 tab(s), po, daily, 95 tab(s), 3 Refill(s).        nortriptyline 50 mg oral capsule: 50 mg, 1 cap(s), PO, daily, 95 cap(s), 3 Refill(s).        Tamiflu 75 mg oral capsule: 75 mg, 1 cap(s), PO, BID, for 5 day(s), 10 cap(s), 0 Refill(s).        MiraLax oral powder for reconstitution: 17 gm, po, daily, 0 Refill(s).        K-Dur 20 oral tablet, extended release: 20 mEq, 1 tab(s), po, daily, 95 tab(s), 3 Refill(s).        Zantac 300 oral tablet: 300 mg, 1 tab(s), PO, Once a day (at bedtime), Will try instead of omeprazole, 95 tab(s), 3 Refill(s).        Vitamin B1: 100 mg, daily.        torsemide 10 mg oral tablet: 20 mg, 2 tab(s), po, daily, 0 Refill(s).        Demadex 10 mg oral tablet: 10 mg, 1 tab(s), PO, daily, 95 tab(s), 3 Refill(s).                Allergies    spironolactone (gynecomastia)

## 2022-02-16 NOTE — PROGRESS NOTES
Patient:   SANTOSH TEJADA            MRN: [id number]            FIN: [id number]               Age:   72 years     Sex:  Male     :  1945   Associated Diagnoses:   None   Author:   Glenroy Capone MD      Visit Information      Primary Care Provider (PCP):  Dylon Evans MD# [id number]      Referring Provider:  No referring provider recorded for selected visit.      Chief Complaint   2017 3:16 PM CDT    pt here for chronic sore throat, Dr. evans sent him over      History of Present Illness   Asked to see by Dr. Evans for sore throat.  Problems with having discomfort with prolonged talking and singing.  It feels dry and scratchy.  He uses Biotene a lot for dry throat.  He uses Aquaphor for nosebleeds and it has controled his symptoms.  No problems with swallowing.  Food does not get stuck.  No aspiration.  He doesn't do much talking during the week.  On Sundays he makes visits and bible study where he is singing, devotion and reading.  He is retired.  Not much conversation going on during the week.        Review of Systems   Constitutional:  Negative.    Ear/Nose/Mouth/Throat:  Negative except as documented in history of present illness.    Respiratory:  Negative.       Health Status   Allergies:    Allergic Reactions (Selected)  Severity Not Documented  Spironolactone (Gynecomastia)   Medications:  (Selected)   Prescriptions  Prescribed  Demadex 10 mg oral tablet: 1 tab(s) ( 10 mg ), PO, daily, # 95 tab(s), 3 Refill(s), Type: Soft Stop, Pharmacy: [facility], 1 tab(s) po daily  K-Dur 20 oral tablet, extended release: 1 tab(s) ( 20 mEq ), po, daily, # 95 tab(s), 3 Refill(s), Type: Soft Stop, Pharmacy: [facility], member number [id number], 1 tab(s) po daily  Neurontin 600 mg oral tablet: 1 tab(s) ( 600 mg ), PO, qid, # 370 tab(s), 3 Refill(s), Type: Maintenance, Pharmacy: [facility], 1 tab(s) po qid  Zantac 300 oral tablet: 1 tab(s) ( 300 mg ), PO, Once a day (at bedtime),  Instructions: Will try instead of omeprazole, # 95 tab(s), 3 Refill(s), Type: Maintenance, Pharmacy: [facility], 1 tab(s) po hs,Instr:Will try instead of omeprazole  naltrexone 50 mg oral tablet: 1 tab(s) ( 50 mg ), po, daily, # 95 tab(s), 3 Refill(s), Type: Maintenance, Pharmacy: [facility], 1 tab(s) po daily  nortriptyline 50 mg oral capsule: 1 cap(s) ( 50 mg ), PO, daily, # 95 cap(s), 3 Refill(s), Type: Maintenance, Pharmacy: [facility], 1 cap(s) po daily  Documented Medications  Documented  CLA- Safflower oil: CLA- Safflower oil, ( 1,000 mg ), Supply, 0 Refill(s), Type: Maintenance  Centrum oral tablet: 1 tab(s), PO, Daily, # 90 tab(s), 0 Refill(s)  Citracal: 3 tab(s), po, daily, 0 Refill(s), Type: Maintenance  Claritin 10 mg oral tablet: 1 tab(s) ( 10 mg ), PO, Daily, # 10 tab(s), 0 Refill(s), Type: Maintenance  Iron: Iron, ( 65 mg ), po, daily, Supply, 0 Refill(s), Type: Maintenance  Vitamin B1: ( 100 mg ), daily, 0 Refill(s), Type: Maintenance  torsemide 10 mg oral tablet: 2 tab(s) ( 20 mg ), po, daily, 0 Refill(s), Type: Maintenance   Problem list:    All Problems (Selected)  Esophageal varices / ICD-9-.1 / Confirmed  Hayfever / ICD-9-.9 / Confirmed  Osteopenia / ICD-9-CM V13.59 / Confirmed  Hypertension / SNOMED CT 1698834570 / Confirmed  Liver Cirrhosis, Alcoholic / ICD-9-.2 / Confirmed  Lymphoplasmacytoid lymphoma, 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  Comments:  1/14/2010 CST 8:18 AM CST - Dawna Staley CMA  With esophagitis; blood transfusion 2007  Inguinal Hernia (550.90):  Resolved on 1/14/2010 at 64 years.   Family History:     Systemic Lupus Erythematosus  Sister (Coni, )  Meningitis  Brother (Mario)  Diabetes mellitus  Brother (Duglas)  Hypertension  Sister  Seizure disorder  Brother (Mario)  Rheumatic fever  Sister (Coni, )  Cerebral aneurysm  Mother (Bettie, )  CA - Cancer of colon  Father (Yasmin, )     Procedure history:    Bone marrow biopsy ([id number]) on 2016 at 71 Years.  Comments:  2016 9:31 AM - Zelda Grande  Indication: Lymphoma.  Colonoscopy ([id number]) on 2015 at 70 Years.  Comments:  2015 4:35 PM - Nikki Villalobos RN  Sedation: Propofol  Indication: history of tubular adenoma  Findings: Diverticulosis of the sigmoid and ascending colon. Benign leiomyoma.  F/U: Repeat colonoscopy in 5 years  Esophagogastroduodenoscopy ([id number]) on 2011 at 66 Years.  Comments:  2011 7:15 AM - Dylon Evans MD  Small esophageal varices; portal hypertensive gastropathy; small hiatal hernia  Colonoscopy ([id number]) on 2011 at 65 Years.  Colonoscopy ([id number]) on 2010 at 64 Years.  Comments:  2/3/2015 5:13 PM - Lakeisha Aceves MA  IV sedation used.    10/7/2010 7:31 PM - Cathy Romero  Repeat Colonoscopy in 5 years    3/15/2010 12:28 PM - Allegra Gomez  normal  Echocardiogram ([id number]) in the month of 10/2009 at 64 Years.  Comments:  3/15/2010 12:28 PM - Allegra Gomez  EF 65%  Colonoscopy ([id number]) in the month of 2005 at 60 Years.  Comments:  3/15/2010 12:28 PM - Allegra Gomez  Tubular adenoma  Cataract extraction ([id number]) in the month of 2000 at 55 Years.  Colonoscopy ([id number]) in  at 39 Years.  Herniorrhaphy ([id number]) in  at 36 Years.  Herniorrhaphy ([id number]) in  at 14 Years.  Comments:  3/15/2010 12:25 PM - Allegra Gomez  right  Appendectomy ([id number]) in 1957 at 12 Years.  Repair procedure for malrotation of intestine.      Physical Examination   Vital Signs   2017 3:16 PM CDT    Temperature  Tympanic      98.1 DegF     Measurements from flowsheet : Measurements   5/12/2017 3:16 PM CDT Height Measured - Standard 68 in    Weight Measured - Standard 196.2 lb    BSA 2.06 m2    Body Mass Index 29.83 kg/m2      CONSTITUTIONAL  General Appearance:  Normal, well developed, well nourished, no obvious distress  Ability to Communicate:  communicates appropriately.    HEAD AND FACE  Appearance and Symmetry:  Normal, no scalp or facial scarring or suspicious lesions.  Paranasal sinuses tenderness:  Normal, Paranasal sinuses non tender    EARS  Clinical speech reception threshold:  Normal, able to hear normal speech.  Auricle:  Normal, Auricles without scars, lesions, masses.  External auditory canal:  Normal, External auditory canal normal.  Tympanic membrane:  Normal, Tympanic membranes normal without swelling or erythema.  Tympanic membrane mobility:  Normal, Normal tympanic membrane mobility.    NOSE (speculum or scope)  Architecture:  Normal, Grossly normal external nasal architecture with no masses or lesions.  Mucosa:  Normal mucosa, No polyps or masses.  Septum:  Normal, Septum non-obstructing.  Turbinates:  Normal, No turbinate abnormalities    ORAL CAVITY AND OROPHARYNX  Lips:  Normal.  Dental and gingiva:  Normal, No obvious dental or gingival disease.  Mucosa:  Mucosa dessicated.  Tongue:  Normal, Tongue mobile with no mucosal abnormalities  Oral pharynx:  Normal, Posterior pharynx without lesions or remarkable asymmetry.  Saliva:  Normal, Clear saliva.  Masses:  Normal, No palpable masses or pathologically enlarged lymph nodes.    LARYNGOSCOPY  Risks and benefit of Flexible laryngeal endoscopy were discussed with the patient and they wished to proceed.  Patient tolerated the procedure well.  See exam note for findings.    LARYNX AND HYPOPHARYNX (mirror or scope)  Voice Quality:  Normal voice quality.  Supra glottis:  Normal, No edema or erythema.  Epiglottis:  Normal, No epiglottic mass or mucosal  lesions.  Pyriform sinuses:  Normal, Pyriform sinuses clear.  Vocal cords appearance:  Normal, Vocal cord motion symmetric.  Vocal cord motion:  Normal, Vocal cord motion symmetric  Endolarynx:  Normal, No Endolaryngeal mass or mucosal lesions.    NECK  Masses/lymph nodes:  Normal, No worrisome neck masses or lymph nodes.  Salivary glands:  Normal, Parotid and submandibular glands.  Trachea and larynx position:  Normal, Trachea and larynx midline.  Thyroid:  Normal, No thyroid abnormality.  Tenderness:  Normal, No cervical tenderness.  Suppleness:  Normal, Neck supple    NEUROLOGICAL  Speech pattern:  Normal, Proasaic    RESPIRATORY  Symmetry and Respiratory effort:  Normal, Symmetric chest movement and expansion with no increased intercostal retractions or use of accessory muscles.       Impression and Plan   No evidence of mass or lesion within the larynx.  The hoarseness is likely related to voice overuse.  In addition his mucosa is dry and dessicated likely secondary to side effects of several medications.  I discussed consideration for speech therapy evaluation.  The patient delcined.

## 2022-02-16 NOTE — PROGRESS NOTES
Patient:   SANTOSH TEJADA            MRN: [id number]            FIN: [id number]               Age:   72 years     Sex:  Male     :  1945   Associated Diagnoses:   Liver Cirrhosis, Alcoholic; Osteopenia; Esophageal varices; Pruritus; Polyneuropathy; Lymphoplasmacytoid lymphoma, CLL   Author:   Dylon Evans MD      Visit Information      Date of Service: 2018 09:44 am  Performing Location: [facility]  Encounter#: [id number]      Primary Care Provider (PCP):  Dylon Evans MD    NPI# [id number]      Referring Provider:  Dylon Ramon MD    NPI# [id number]      Chief Complaint   2018 10:12 AM CST    BP check      History of Present Illness   Feeling better than two weeks ago with no fevers. Tolerated the Levaquin and tamiflu. Energy improved following the infusion two weeks ago although starting to feel weak in past few days.  Will get permanent front teeth.    Itching has been less. Minimal swelling in the legs since stopping the torsemide. Appetite is good.   Has some burning on the tips of the toes and feet. Neurontin and nortriptylline with benefit. Stopped lyrica due to expense.  Weight is stable.  Chronic hoarseness for years.      Review of Systems   Constitutional:  naltrexone has helped the itching.    Respiratory:  No shortness of breath.    Cardiovascular:  No chest pain, No peripheral edema.    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    Integumentary:  easy bruising and skin tears easily.    Neurologic:  No dizziness.    PHQ-9: 1pt (2017)  CAGE: 0pts (2017)      Health Status   Allergies:    Allergic Reactions (Selected)  Severity Not Documented  Spironolactone (Gynecomastia)   Medications:  (Selected)   Prescriptions  Prescribed  Levaquin 750 mg oral tablet: 1 tab(s) ( 750 mg ), PO, q24hr, # 7 tab(s), 0 Refill(s), Type: Maintenance, Pharmacy: [facility], 1 tab(s) po q 24 hrs,x7  day(s)  Neurontin 600 mg oral tablet: 1 tab(s) ( 600 mg ), PO, qid, # 370 tab(s), 3 Refill(s), Type: Maintenance, Pharmacy: [facility], 1 tab(s) po qid  Tamiflu 75 mg oral capsule: 1 cap(s) ( 75 mg ), PO, BID, # 10 cap(s), 0 Refill(s), Type: Maintenance, Pharmacy: [facility], 1 cap(s) po bid,x5 day(s)  Zantac 300 oral tablet: 1 tab(s) ( 300 mg ), PO, Once a day (at bedtime), Instructions: Will try instead of omeprazole, # 95 tab(s), 3 Refill(s), Type: Maintenance, Pharmacy: [facility], 1 tab(s) po hs,Instr:Will try instead of omeprazole  nortriptyline 50 mg oral capsule: 1 cap(s) ( 50 mg ), PO, daily, # 95 cap(s), 3 Refill(s), Type: Maintenance, Pharmacy: [facility], 1 cap(s) po daily  Documented Medications  Documented  CLA- Safflower oil: CLA- Safflower oil, ( 1,000 mg ), Supply, 0 Refill(s), Type: Maintenance  Centrum oral tablet: 1 tab(s), PO, Daily, # 90 tab(s), 0 Refill(s)  Claritin 10 mg oral tablet: 1 tab(s) ( 10 mg ), PO, Daily, # 10 tab(s), 0 Refill(s), Type: Maintenance  Iron: Iron, ( 65 mg ), po, daily, Supply, 0 Refill(s), Type: Maintenance  MiraLax oral powder for reconstitution: ( 17 gm ), po, daily, 0 Refill(s), Type: Maintenance  Vitamin B1: ( 100 mg ), daily, 0 Refill(s), Type: Maintenance  calcium (as carbonate) 600 mg oral tablet: 1 tab(s) ( 600 mg ), po, tid, 0 Refill(s), Type: Maintenance  ferrous sulfate 325 mg (65 mg elemental iron) oral tablet: 1 tab(s) ( 325 mg ), po, daily, 0 Refill(s), Type: Maintenance   Problem list:    All Problems  Esophageal varices / ICD-9-.1 / Confirmed  Hayfever / ICD-9-.9 / Confirmed  Osteopenia / ICD-9-CM V13.59 / Confirmed  Hypertension / SNOMED CT 4178452855 / Confirmed  Liver Cirrhosis, Alcoholic / ICD-9-.2 / Confirmed  Lymphoplasmacytoid lymphoma, CLL / SNOMED CT 189806206 / Confirmed  Neuropathy / ICD-9-.9 / Confirmed  Obesity / ICD-9-.00 / Probable  Prediabetes / ICD-9-CM  790.29 / Confirmed  Renal Insufficiency / ICD-9-.9 / Confirmed  Tubular Adenoma / ICD-9-.9 / Confirmed  Inactive: Chemical Dependency / ICD-9-.90  Inactive: Malrotation of Intestine / ICD-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  Inactive: Osteoporosis / ICD-9-.00  Resolved: Esophageal Ulcer / ICD-9-.20  Resolved: Inguinal Hernia / ICD-9-.90      Histories   Family History: Sister with SLE; Brother with seizure disorder; Dad with colon cancer; mom  cerebral aneurysm    Social History: Living in house by himself      Physical Examination   Vital Signs   2018 10:12 AM CST Peripheral Pulse Rate 84 bpm    Pulse Site Radial artery    HR Method Manual    Systolic Blood Pressure 120 mmHg    Diastolic Blood Pressure 50 mmHg  LOW    Mean Arterial Pressure 73 mmHg    BP Site Right arm    BP Method Manual      General:  Alert and oriented, No acute distress.    Neck:  No lymphadenopathy.    Respiratory:  Lungs are clear to auscultation.    Cardiovascular:  Normal rate, Regular rhythm, No edema.    Musculoskeletal:  Normal strength, Normal gait, both hands with dupuytrens contractures.    Integumentary:  minimal bruising.       Review / Management     HgbA1c: 5.8% (2018)   MELD score: 8  Recent: hgb 6.2 with PLT 15 and WBC 1.0  AFP: 1.2      Impression and Plan   Diagnosis     Liver Cirrhosis, Alcoholic (MMA88-CH K70.30).     Osteopenia (JWI59-VF M85.80).     Esophageal varices (OKX43-WL I85.00).     Pruritus (SLZ95-SD L29.9).     Polyneuropathy (DSF44-PJ G62.1).     Lymphoplasmacytoid lymphoma, CLL (TNY51-AY C83.00).       Pancytopenia: Lymphoplasmacytoid Stage IV and remission. Now has myelodysplastic syndrome and pancytopenia. Received transfusion two weeks ago and will give another transfusion tonight.    Liver Cirrhosis: weighed 188 lbs in 2011. Taking the torsemide 20mg daily and spironolactone was stopped. Weight prior to cirrhosis was 178. Current weight around 200 since 2012. Gets  liver ultrasound and AFP every 6 months ( February 2018). Instructed by GI not to follow ammonia level unless symptomatic. Will follow labs every 6 months. Discontinue torsemide and spironolactone 2017 and no significant increase in edema  Pruritis: stopped the naltrexone and doing fine  Osteopenia: diagnosed March 2010 with spine -4.9. Took fosamax. Recheck showed osteopenia  -1.76 in 2012 and so stopped fosamax. Recheck in 2015 showed -1.2 femur and 0.4 spine with planned recheck in 2020  Renal insufficiency: Resolved  GERD: ranitidine controlling the acid reflux (switched from omeprazole)    Foot Neuropathy: notriptylline and gabapentin. Stopped lyrica 2015 and doing well.  Left heel pain: diagnosed with plantar neuroma and doing well with new shoes  Colon cancer screen: adenoma 2005 and normal 2010. Benign Leiomyoma 2015 and repeat 2020  Prostate Cancer screening: not indicated  EGD: 2009 showed grade 0/1 esophageal varices and gastric portal hypertension. Repeat 2015 showed stable and consider repeat 2020  Immunizations: Pneumovax: 2009/2014. Adacel 2011. Prevnar 2015  Chronic hoarsemess: uses biotene and recommended ENT consult in past  Code Status: DNR    Addendum 2/7: Temperature 100.2 when started transfusion. Asymptomatic. Temperature 101.2  after the first transfusion. Discussed with Oncology and will just do one instead of two units tonight. Follow up with Oncology on Friday

## 2022-02-16 NOTE — PROGRESS NOTES
Patient:   SANTOSH TEJADA            MRN: [id number]            FIN: [id number]               Age:   73 years     Sex:  Male     :  1945   Associated Diagnoses:   Liver Cirrhosis, Alcoholic; Osteopenia; Esophageal varices; Pruritus; Polyneuropathy; Lymphoplasmacytoid lymphoma, CLL   Author:   Dylon Evans MD      Visit Information      Date of Service: 2018 01:36 pm  Performing Location: [facility]  Encounter#: [id number]      Primary Care Provider (PCP):  Dylon Evans MD    NPI# [id number]      Referring Provider:  Dylon Evans MD    NPI# [id number]      Chief Complaint   2018 1:39 PM CDT    F/u hosp        History of Present Illness   Hospitalized two weeks with fever. Treating with Levaquin, Voriconazole. Needed one PRBC transfusion and two platelet transfusions while in hospital for two weeks.    Bone marrow biopsy done in past week to look for fungal infection. Had 2% blast 2018.    Has progressive myelodysplastic dysplasia with 5-7% blasts on bone marrow biopsy 2018.     Energy and attitude are improving over past few days. Feels wobbly and relying on the cane more now.  Will get permanent front teeth.    Itching has been less. Minimal swelling in the legs since stopping the torsemide. Appetite is good.   Has some burning on the tips of the toes and feet. Neurontin and nortriptylline with benefit. Stopped lyrica due to expense.  Weight is stable.  Chronic hoarseness for years.      Review of Systems   Constitutional:  decreased energy.    Respiratory:  No shortness of breath.    Cardiovascular:  No chest pain, No peripheral edema.    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    Integumentary:  easy bruising and skin tears easily.    Neurologic:  No dizziness.    PHQ-9: 1pt (2017)  CAGE: 0pts (2017)      Health Status   Allergies:    Allergic Reactions (Selected)  Severity Not Documented  Spironolactone (Gynecomastia)    Medications:  (Selected)   Prescriptions  Prescribed  Neurontin 600 mg oral tablet: 1 tab(s) ( 600 mg ), PO, qid, # 370 tab(s), 3 Refill(s), Type: Maintenance, Pharmacy: [facility], 1 tab(s) po qid  Zantac 300 oral tablet: 1 tab(s) ( 300 mg ), PO, Once a day (at bedtime), Instructions: Will try instead of omeprazole, # 95 tab(s), 3 Refill(s), Type: Maintenance, Pharmacy: [facility], 1 tab(s) po hs,Instr:Will try instead of omeprazole  nortriptyline 50 mg oral capsule: 1 cap(s) ( 50 mg ), PO, daily, # 95 cap(s), 3 Refill(s), Type: Maintenance, Pharmacy: [facility], 1 cap(s) po daily  Documented Medications  Documented  Centrum oral tablet: 1 tab(s), PO, Daily, # 90 tab(s), 0 Refill(s)  Claritin 10 mg oral tablet: 1 tab(s) ( 10 mg ), PO, Daily, # 10 tab(s), 0 Refill(s), Type: Maintenance  Levaquin 500 mg oral tablet: 1 tab(s) ( 500 mg ), po, daily, Instructions: start date 6/14/18 end date 6/28/18 per [employer], 0 Refill(s), Type: Maintenance  Magic Mouthwash: Magic Mouthwash, Instructions: take 10 mL by mouth 4 times daily if needed. Swish and spit for mouth sores, garle for sore throat, Compound, 0 Refill(s), Type: Maintenance  MiraLax oral powder for reconstitution: ( 17 gm ), po, daily, 0 Refill(s), Type: Maintenance  Vitamin B1: ( 100 mg ), daily, 0 Refill(s), Type: Maintenance  Vitamin C 1000 mg oral tablet: 1 tab(s) ( 1,000 mg ), po, daily, 0 Refill(s), Type: Maintenance  Vitamin K-2: Vitamin K-2, Instructions: take 100 mcg once daily, 0 Refill(s), Type: Maintenance  calcium (as carbonate) 600 mg oral tablet: 1 tab(s) ( 600 mg ), po, tid, 0 Refill(s), Type: Maintenance  predniSONE 20 mg oral tablet: See Instructions, Instructions: 2 tab(s) po daily 5 day(s), then 1 tab daily x 5 days, then 0.5 tab daily x 6 days, 0 Refill(s), Type: Maintenance, start date 6/13/18  prochlorperazine 10 mg oral tablet: 1 tab(s) ( 10 mg ), po, q6 hrs, Instructions: Per [facility] 3/11/2018, 0 Refill(s),  Type: Maintenance  valACYclovir 500 mg oral tablet: 1 tab(s) ( 500 mg ), po, daily, 0 Refill(s), Type: Maintenance  voriconazole 200 mg oral tablet: 1 tab(s) ( 200 mg ), po, q12 hrs, Instructions: start date 18 end date 18 per [employer], 0 Refill(s), Type: Maintenance   Problem list:    All Problems  Liver cirrhosis, alcoholic / SNOMED CT 8843024101 / Confirmed  Hayfever / SNOMED CT 36619377 / Confirmed  Chemotherapy-induced neutropenia / SNOMED CT 6241679687 / Confirmed  Esophageal varices / SNOMED CT 18733351 / Confirmed  History of osteopenia / SNOMED CT 128435881 / Confirmed  Hypertensive disorder / SNOMED CT 1833617492 / Confirmed  Lymphoplasmacytoid lymphoma, CLL / SNOMED CT 655458829 / Confirmed  Myelodysplastic syndrome / SNOMED CT 537190691 / Confirmed  Neuropathy / SNOMED CT 0709093329 / Confirmed  Obesity / ICD-9-.00 / Probable  Prediabetes / SNOMED CT 1978587419 / Confirmed  Renal insufficiency / SNOMED CT 8879983692 / Confirmed  Chronic subdural hematoma / SNOMED CT 328379772 / Confirmed  Tubular adenoma / SNOMED CT 2871850924 / Confirmed  Inactive: Malrotation of intestine / SNOMED CT 79033439  Inactive: Chemical dependency / SNOMED CT 014649021  Inactive: Osteoporosis / SNOMED CT 380771353  Resolved: Inguinal hernia / SNOMED CT 0571232328  Resolved: Inpatient stay / SNOMED CT 086437352  Resolved: Inpatient stay / SNOMED CT 397291961  Resolved: Esophageal ulcer / SNOMED CT 52675475      Histories   Family History: Sister with SLE; Brother with seizure disorder; Dad with colon cancer; mom  cerebral aneurysm    Social History: Living in house by himself      Physical Examination   Vital Signs   2018 1:39 PM CDT Temperature Tympanic 97.4 DegF  LOW    Peripheral Pulse Rate 90 bpm    Pulse Site Radial artery    HR Method Manual    Systolic Blood Pressure 118 mmHg    Diastolic Blood Pressure 60 mmHg    Mean Arterial Pressure 79 mmHg    BP Site Right arm    BP Method Manual    Oxygen  Saturation 98 %      Measurements from flowsheet : Measurements   6/14/2018 1:39 PM CDT Height Measured - Standard 68 in    Weight Measured - Standard 168 lb    BSA 1.91 m2    Body Mass Index 25.54 kg/m2  HI      General:  Alert and oriented, No acute distress.    Eye:  Normal conjunctiva.    Neck:  No lymphadenopathy.    Respiratory:  Lungs are clear to auscultation.    Cardiovascular:  Normal rate, Regular rhythm, No edema.    Gastrointestinal:  Soft, Non-tender, Non-distended.    Musculoskeletal:  Normal strength, Normal gait, both hands with dupuytrens contractures.    Integumentary:  minimal bruising.    Extremities: minimal edema   Psychiatric:  Appropriate mood & affect.       Review / Management     HgbA1c: 5.8% (February 2018)   Results review:  Lab results   6/14/2018 2:44 PM CDT WBC 2.0    Hgb 9.1 g/dL    Platelet 29   .    MELD score: 8 (February 2018)  AFP: 1.2 (February 2018)      Impression and Plan   Diagnosis     Liver Cirrhosis, Alcoholic (VJY24-JD K70.30).     Osteopenia (DHN59-ZG M85.80).     Esophageal varices (ZPO14-JO I85.00).     Pruritus (YBP41-TT L29.9).     Polyneuropathy (JIF97-GG G62.1).     Lymphoplasmacytoid lymphoma, CLL (ZOY66-MN C83.00).       Pancytopenia: Lymphoplasmacytoid Stage IV and remission. Now has progressive myelodysplastic syndrome 2% blasts (June 2018) decreased from 5-7% (February 2018) and pancytopenia. Being transfused for Platelets < 15 and Hemoglobin < 7.5  Medications: prophylactically taking Voriconazole, valcyclovir and Levaquin daily with low WBC and neutrophils.    Liver Cirrhosis: weighed 188 lbs in April 2011. Weight prior to cirrhosis was 178. Weight in recent years around 200 since Sept 2012. Post hospital discharge weight now 168 lbs. Gets liver ultrasound and AFP every 6 months ( February 2018). Instructed by GI not to follow ammonia level unless symptomatic. Will follow labs every 6 months. Discontinued torsemide and spironolactone 2017 and no  significant increase in edema    Pruritis: stopped the naltrexone and doing fine  Osteopenia: diagnosed March 2010 with spine -4.9. Took fosamax. Recheck showed osteopenia  -1.76 in 2012 and so stopped fosamax. Recheck in 2015 showed -1.2 femur and 0.4 spine with planned recheck in 2020  Renal insufficiency: Resolved. Creatinine 0.75 June 2018 in hospital  GERD: ranitidine controlling the acid reflux (switched from omeprazole)    Foot Neuropathy: notriptylline and gabapentin. Stopped lyrica 2015 and doing well.  Left heel pain: diagnosed with plantar neuroma and doing well with new shoes  Colon cancer screen: adenoma 2005 and normal 2010. Benign Leiomyoma 2015 and repeat 2020  Prostate Cancer screening: not indicated  EGD: 2009 showed grade 0/1 esophageal varices and gastric portal hypertension. Repeat 2015 showed stable and consider repeat 2020  Immunizations: Pneumovax: 2009/2014. Adacel 2011. Prevnar 2015  Chronic hoarsemess: uses biotene and recommended ENT consult in past  Code Status: DNR    Discharge summary reviewed and medications reconciled

## 2022-02-16 NOTE — CARE COORDINATION
Patient:   SANTOSH TEJADA            MRN: [id number]            FIN: [id number]               Age:   73 years     Sex:  Male     :  1945   Associated Diagnoses:   None   Author:   Carlee Estrada      Sources of Information:  [ x] Patient, family member, or caregiver (Please list):  Called to pt LMOM to see how he is doing, to call back if he chooses.  [ x] Hospital discharge summary  [ ] Hospital fax  [ ] List of recent hospitalizations or ED visits  [ ] Other:   Discharged From: [facility]  Discharge Date: 3/11/2018  Diagnosis/Problem: Neutropenic fever, falls, hand cellulitis  Discharge note reviewed: [x ] Yes [ ]No  Medication Changes: [ x] Yes [ ] No   Medication List Updated: [ x] Yes [ ] No  Needs Referral or Lab: [ x] Yes [ ] No   CBC 2 x week with Dr. Domingo  F/U Appointment Made With:  Date:  Patient contacted: Called to pt LMOM to see how he is doing, to call back if he chooses, and to make appt with PCP    Additional Information :  Pt is to have OT for home safety eval, PT eval and treatPt called back and states that he is doing a little better, transferred to reception to make appt with GJM.

## 2022-02-16 NOTE — PROGRESS NOTES
Chief Complaint    c/o abcess on left thigh was drained at [place] couple of weeks ago area is warm to the touch, poss spider bite on left inner thigh  History of Present Illness      Had a mass develop in the left thigh a few months ago. Seen by Surgery and sent for ultrasound guided biopsy but found to be liquid. Aspiration performed and now reaccumulating. Had 50cc pus like material removed but all cultures negative. Had a degloving accident with the dorsal right hand. Had suturing done and now healing well with scabs.       Developed a new lesion yesterday on left inner thigh.  Review of Systems      No fevers       No vomiting  Physical Exam   Vitals & Measurements    T: 97.8   F (Tympanic)  HR: 80(Peripheral)  BP: 110/60       General: No acute distress      Musculoskeletal: Normal gait.  Good strength and range of motion of the left lower extremity.      Skin: Left inner thigh with a 2 cm 1 cm ulceration.  It is tender.  Left dorsal thigh with a subcutaneous mass that radiates.  It is nontender.       Lungs: Clear       Heart: Regular rate and rhythm  Assessment/Plan   Ulceration left inner thigh: Possibly related to the subcutaneous mass in the dorsal thigh.  This could be related to his MDS. Refer to Dermatology  Patient Information     Name:SANTOSH TEJADA      Address:      [address] [place] [address]      Sex:Male      YOB: 1945      [phone]      Emergency Contact:BERNIE CARRASCO     MRN:[id number]     FIN:[id number]     Location:[facility]     Date of Service:07/23/2018      Primary Care Physician:       Dylon Evans MD, [phone]      Attending Physician:       Dylon Evans MD, [phone]  Medications        Vitamin B1: 100 mg, daily.        Centrum oral tablet: 1 tab(s), PO, Daily, 90 tab(s).        Claritin 10 mg oral tablet: 10 mg, 1 tab(s), PO, Daily, for 10 day(s), 10 tab(s), 0 Refill(s).        calcium (as carbonate) 600 mg oral tablet: 600 mg, 1  tab(s), po, tid, 0 Refill(s).        MiraLax oral powder for reconstitution: 17 gm, po, daily, 0 Refill(s).        Zantac 300 oral tablet: 300 mg, 1 tab(s), PO, Once a day (at bedtime), Will try instead of omeprazole, 95 tab(s), 3 Refill(s).        nortriptyline 50 mg oral capsule: 50 mg, 1 cap(s), PO, daily, 95 cap(s), 3 Refill(s).        Neurontin 600 mg oral tablet: 600 mg, 1 tab(s), PO, qid, 370 tab(s), 3 Refill(s).        Vitamin C 1000 mg oral tablet: 1,000 mg, 1 tab(s), po, daily, 0 Refill(s).        prochlorperazine 10 mg oral tablet: 10 mg, 1 tab(s), po, q6 hrs, Per [facility] 3/11/2018, 0 Refill(s).        Vitamin K-2: take 100 mcg once daily, 0 Refill(s).        Magic Mouthwash: take 10 mL by mouth 4 times daily if needed. Swish and spit for mouth sores, garle for sore throat, 0 Refill(s).        predniSONE 20 mg oral tablet: See Instructions, 2 tab(s) po daily 5 day(s), then 1 tab daily x 5 days, then 0.5 tab daily x 6 days, 0 Refill(s).        voriconazole 200 mg oral tablet: 200 mg, 1 tab(s), po, q12 hrs, for 21 day(s), start date 6/13/18 end date 7/4/18 per [employer], 0 Refill(s).                Allergies    spironolactone (gynecomastia)